# Patient Record
Sex: MALE | Race: WHITE | NOT HISPANIC OR LATINO | Employment: OTHER | URBAN - METROPOLITAN AREA
[De-identification: names, ages, dates, MRNs, and addresses within clinical notes are randomized per-mention and may not be internally consistent; named-entity substitution may affect disease eponyms.]

---

## 2017-12-06 ENCOUNTER — APPOINTMENT (OUTPATIENT)
Dept: GENERAL RADIOLOGY | Facility: HOSPITAL | Age: 62
End: 2017-12-06

## 2017-12-06 ENCOUNTER — HOSPITAL ENCOUNTER (INPATIENT)
Facility: HOSPITAL | Age: 62
LOS: 1 days | Discharge: HOME HEALTH | End: 2017-12-07
Attending: FAMILY MEDICINE | Admitting: FAMILY MEDICINE

## 2017-12-06 ENCOUNTER — APPOINTMENT (OUTPATIENT)
Dept: GENERAL RADIOLOGY | Facility: HOSPITAL | Age: 62
End: 2017-12-06
Attending: INTERNAL MEDICINE

## 2017-12-06 ENCOUNTER — APPOINTMENT (OUTPATIENT)
Dept: ULTRASOUND IMAGING | Facility: HOSPITAL | Age: 62
End: 2017-12-06

## 2017-12-06 DIAGNOSIS — Z74.09 IMPAIRED MOBILITY AND ADLS: Primary | ICD-10-CM

## 2017-12-06 DIAGNOSIS — E87.5 HYPERKALEMIA: ICD-10-CM

## 2017-12-06 DIAGNOSIS — Z74.09 IMPAIRED FUNCTIONAL MOBILITY, BALANCE, GAIT, AND ENDURANCE: ICD-10-CM

## 2017-12-06 DIAGNOSIS — Z78.9 IMPAIRED MOBILITY AND ADLS: Primary | ICD-10-CM

## 2017-12-06 DIAGNOSIS — N17.9 AKI (ACUTE KIDNEY INJURY) (HCC): ICD-10-CM

## 2017-12-06 PROBLEM — R19.7 DIARRHEA: Chronic | Status: ACTIVE | Noted: 2017-12-06

## 2017-12-06 PROBLEM — R53.1 WEAKNESS: Status: ACTIVE | Noted: 2017-12-06

## 2017-12-06 PROBLEM — Z87.01 HISTORY OF PNEUMONIA: Status: ACTIVE | Noted: 2017-12-06

## 2017-12-06 PROBLEM — R06.00 DYSPNEA: Status: ACTIVE | Noted: 2017-12-06

## 2017-12-06 PROBLEM — N18.2 CKD (CHRONIC KIDNEY DISEASE), STAGE II: Chronic | Status: ACTIVE | Noted: 2017-12-06

## 2017-12-06 PROBLEM — J96.01 ACUTE RESPIRATORY FAILURE WITH HYPOXIA (HCC): Status: ACTIVE | Noted: 2017-12-06

## 2017-12-06 PROBLEM — C34.90 LUNG CANCER (HCC): Status: ACTIVE | Noted: 2017-12-06

## 2017-12-06 PROBLEM — C79.31 BRAIN METASTASIS: Status: ACTIVE | Noted: 2017-12-06

## 2017-12-06 PROBLEM — R94.02 ABNORMAL BRAIN SCAN: Chronic | Status: ACTIVE | Noted: 2017-12-06

## 2017-12-06 LAB
ALBUMIN SERPL-MCNC: 4.1 G/DL (ref 3.2–4.8)
ALBUMIN/GLOB SERPL: 1.5 G/DL (ref 1.5–2.5)
ALP SERPL-CCNC: 79 U/L (ref 25–100)
ALT SERPL W P-5'-P-CCNC: 67 U/L (ref 7–40)
ANION GAP SERPL CALCULATED.3IONS-SCNC: 11 MMOL/L (ref 3–11)
APTT PPP: <24 SECONDS (ref 24–31)
AST SERPL-CCNC: 30 U/L (ref 0–33)
BASOPHILS # BLD AUTO: 0.01 10*3/MM3 (ref 0–0.2)
BASOPHILS NFR BLD AUTO: 0.1 % (ref 0–1)
BILIRUB SERPL-MCNC: 0.4 MG/DL (ref 0.3–1.2)
BILIRUB UR QL STRIP: NEGATIVE
BNP SERPL-MCNC: 12 PG/ML (ref 0–100)
BUN BLD-MCNC: 90 MG/DL (ref 9–23)
BUN/CREAT SERPL: 50 (ref 7–25)
CA-I SERPL ISE-MCNC: 1.29 MMOL/L (ref 1.12–1.32)
CALCIUM SPEC-SCNC: 9.6 MG/DL (ref 8.7–10.4)
CHLORIDE SERPL-SCNC: 101 MMOL/L (ref 99–109)
CLARITY UR: CLEAR
CO2 SERPL-SCNC: 23 MMOL/L (ref 20–31)
COLOR UR: YELLOW
CREAT BLD-MCNC: 1.8 MG/DL (ref 0.6–1.3)
CREAT UR-MCNC: 98.6 MG/DL
D-LACTATE SERPL-SCNC: 2.6 MMOL/L (ref 0.5–2)
D-LACTATE SERPL-SCNC: 2.7 MMOL/L (ref 0.5–2)
DEPRECATED RDW RBC AUTO: 59 FL (ref 37–54)
EOSINOPHIL # BLD AUTO: 0.12 10*3/MM3 (ref 0–0.3)
EOSINOPHIL NFR BLD AUTO: 1.1 % (ref 0–3)
EOSINOPHIL SPEC QL MICRO: 0 % EOS/100 CELLS (ref 0–0)
ERYTHROCYTE [DISTWIDTH] IN BLOOD BY AUTOMATED COUNT: 15.9 % (ref 11.3–14.5)
GFR SERPL CREATININE-BSD FRML MDRD: 39 ML/MIN/1.73
GLOBULIN UR ELPH-MCNC: 2.7 GM/DL
GLUCOSE BLD-MCNC: 93 MG/DL (ref 70–100)
GLUCOSE UR STRIP-MCNC: NEGATIVE MG/DL
HCT VFR BLD AUTO: 35.9 % (ref 38.9–50.9)
HGB BLD-MCNC: 11.8 G/DL (ref 13.1–17.5)
HGB UR QL STRIP.AUTO: NEGATIVE
HOLD SPECIMEN: NORMAL
IMM GRANULOCYTES # BLD: 0.2 10*3/MM3 (ref 0–0.03)
IMM GRANULOCYTES NFR BLD: 1.8 % (ref 0–0.6)
INR PPP: 0.96
KETONES UR QL STRIP: NEGATIVE
LEUKOCYTE ESTERASE UR QL STRIP.AUTO: NEGATIVE
LIPASE SERPL-CCNC: 96 U/L (ref 6–51)
LYMPHOCYTES # BLD AUTO: 2.21 10*3/MM3 (ref 0.6–4.8)
LYMPHOCYTES NFR BLD AUTO: 19.7 % (ref 24–44)
MAGNESIUM SERPL-MCNC: 2.7 MG/DL (ref 1.3–2.7)
MCH RBC QN AUTO: 33.3 PG (ref 27–31)
MCHC RBC AUTO-ENTMCNC: 32.9 G/DL (ref 32–36)
MCV RBC AUTO: 101.4 FL (ref 80–99)
MONOCYTES # BLD AUTO: 1.23 10*3/MM3 (ref 0–1)
MONOCYTES NFR BLD AUTO: 11 % (ref 0–12)
NEUTROPHILS # BLD AUTO: 7.46 10*3/MM3 (ref 1.5–8.3)
NEUTROPHILS NFR BLD AUTO: 66.3 % (ref 41–71)
NITRITE UR QL STRIP: NEGATIVE
NRBC BLD MANUAL-RTO: 0 /100 WBC (ref 0–0)
PH UR STRIP.AUTO: 5.5 [PH] (ref 5–8)
PHOSPHATE SERPL-MCNC: 5.2 MG/DL (ref 2.4–5.1)
PLATELET # BLD AUTO: 243 10*3/MM3 (ref 150–450)
PMV BLD AUTO: 9.8 FL (ref 6–12)
POTASSIUM BLD-SCNC: 4.8 MMOL/L (ref 3.5–5.5)
PROT SERPL-MCNC: 6.8 G/DL (ref 5.7–8.2)
PROT UR QL STRIP: NEGATIVE
PROTHROMBIN TIME: 10.4 SECONDS (ref 9.6–11.5)
RBC # BLD AUTO: 3.54 10*6/MM3 (ref 4.2–5.76)
SODIUM BLD-SCNC: 135 MMOL/L (ref 132–146)
SODIUM UR-SCNC: 75 MMOL/L (ref 30–90)
SP GR UR STRIP: 1.02 (ref 1–1.03)
TSH SERPL DL<=0.05 MIU/L-ACNC: 0.32 MIU/ML (ref 0.35–5.35)
UROBILINOGEN UR QL STRIP: NORMAL
WBC NRBC COR # BLD: 11.23 10*3/MM3 (ref 3.5–10.8)

## 2017-12-06 PROCEDURE — 99223 1ST HOSP IP/OBS HIGH 75: CPT | Performed by: INTERNAL MEDICINE

## 2017-12-06 PROCEDURE — 83605 ASSAY OF LACTIC ACID: CPT | Performed by: NURSE PRACTITIONER

## 2017-12-06 PROCEDURE — 63710000001 DEXAMETHASONE PER 0.25 MG: Performed by: NURSE PRACTITIONER

## 2017-12-06 PROCEDURE — 97162 PT EVAL MOD COMPLEX 30 MIN: CPT

## 2017-12-06 PROCEDURE — 84443 ASSAY THYROID STIM HORMONE: CPT | Performed by: NURSE PRACTITIONER

## 2017-12-06 PROCEDURE — 85025 COMPLETE CBC W/AUTO DIFF WBC: CPT | Performed by: NURSE PRACTITIONER

## 2017-12-06 PROCEDURE — 81003 URINALYSIS AUTO W/O SCOPE: CPT | Performed by: NURSE PRACTITIONER

## 2017-12-06 PROCEDURE — 93005 ELECTROCARDIOGRAM TRACING: CPT | Performed by: NURSE PRACTITIONER

## 2017-12-06 PROCEDURE — 82570 ASSAY OF URINE CREATININE: CPT | Performed by: NURSE PRACTITIONER

## 2017-12-06 PROCEDURE — 71010 HC CHEST PA OR AP: CPT

## 2017-12-06 PROCEDURE — 87205 SMEAR GRAM STAIN: CPT | Performed by: INTERNAL MEDICINE

## 2017-12-06 PROCEDURE — 97165 OT EVAL LOW COMPLEX 30 MIN: CPT

## 2017-12-06 PROCEDURE — 83880 ASSAY OF NATRIURETIC PEPTIDE: CPT | Performed by: NURSE PRACTITIONER

## 2017-12-06 PROCEDURE — 80053 COMPREHEN METABOLIC PANEL: CPT | Performed by: NURSE PRACTITIONER

## 2017-12-06 PROCEDURE — 93010 ELECTROCARDIOGRAM REPORT: CPT | Performed by: INTERNAL MEDICINE

## 2017-12-06 PROCEDURE — 76775 US EXAM ABDO BACK WALL LIM: CPT

## 2017-12-06 PROCEDURE — 84100 ASSAY OF PHOSPHORUS: CPT | Performed by: NURSE PRACTITIONER

## 2017-12-06 PROCEDURE — 99254 IP/OBS CNSLTJ NEW/EST MOD 60: CPT | Performed by: PHYSICIAN ASSISTANT

## 2017-12-06 PROCEDURE — 85610 PROTHROMBIN TIME: CPT | Performed by: NURSE PRACTITIONER

## 2017-12-06 PROCEDURE — 92611 MOTION FLUOROSCOPY/SWALLOW: CPT

## 2017-12-06 PROCEDURE — 83735 ASSAY OF MAGNESIUM: CPT | Performed by: NURSE PRACTITIONER

## 2017-12-06 PROCEDURE — 74230 X-RAY XM SWLNG FUNCJ C+: CPT

## 2017-12-06 PROCEDURE — 82330 ASSAY OF CALCIUM: CPT | Performed by: NURSE PRACTITIONER

## 2017-12-06 PROCEDURE — 85730 THROMBOPLASTIN TIME PARTIAL: CPT | Performed by: NURSE PRACTITIONER

## 2017-12-06 PROCEDURE — 92610 EVALUATE SWALLOWING FUNCTION: CPT

## 2017-12-06 PROCEDURE — 84300 ASSAY OF URINE SODIUM: CPT | Performed by: NURSE PRACTITIONER

## 2017-12-06 PROCEDURE — 83690 ASSAY OF LIPASE: CPT | Performed by: NURSE PRACTITIONER

## 2017-12-06 RX ORDER — ACETAMINOPHEN 325 MG/1
650 TABLET ORAL EVERY 4 HOURS PRN
Status: DISCONTINUED | OUTPATIENT
Start: 2017-12-06 | End: 2017-12-07 | Stop reason: HOSPADM

## 2017-12-06 RX ORDER — DEXAMETHASONE 4 MG/1
2 TABLET ORAL
Status: DISCONTINUED | OUTPATIENT
Start: 2017-12-06 | End: 2017-12-07 | Stop reason: HOSPADM

## 2017-12-06 RX ORDER — SODIUM CHLORIDE 0.9 % (FLUSH) 0.9 %
1-10 SYRINGE (ML) INJECTION AS NEEDED
Status: DISCONTINUED | OUTPATIENT
Start: 2017-12-06 | End: 2017-12-07 | Stop reason: HOSPADM

## 2017-12-06 RX ORDER — LISINOPRIL AND HYDROCHLOROTHIAZIDE 12.5; 1 MG/1; MG/1
1 TABLET ORAL DAILY
Status: ON HOLD | COMMUNITY
End: 2017-12-07

## 2017-12-06 RX ORDER — CITALOPRAM 20 MG/1
20 TABLET ORAL EVERY MORNING
Status: DISCONTINUED | OUTPATIENT
Start: 2017-12-06 | End: 2017-12-07 | Stop reason: HOSPADM

## 2017-12-06 RX ORDER — PANTOPRAZOLE SODIUM 40 MG/1
40 TABLET, DELAYED RELEASE ORAL
Status: DISCONTINUED | OUTPATIENT
Start: 2017-12-06 | End: 2017-12-07 | Stop reason: HOSPADM

## 2017-12-06 RX ORDER — METHYLPHENIDATE HYDROCHLORIDE 5 MG/1
5 TABLET ORAL 3 TIMES DAILY
Status: DISCONTINUED | OUTPATIENT
Start: 2017-12-06 | End: 2017-12-07 | Stop reason: HOSPADM

## 2017-12-06 RX ORDER — FAMOTIDINE 20 MG/1
20 TABLET, FILM COATED ORAL 2 TIMES DAILY PRN
Status: DISCONTINUED | OUTPATIENT
Start: 2017-12-06 | End: 2017-12-07 | Stop reason: HOSPADM

## 2017-12-06 RX ORDER — OMEPRAZOLE 20 MG/1
20 CAPSULE, DELAYED RELEASE ORAL DAILY
COMMUNITY

## 2017-12-06 RX ORDER — LISINOPRIL 10 MG/1
10 TABLET ORAL
Status: DISCONTINUED | OUTPATIENT
Start: 2017-12-06 | End: 2017-12-06

## 2017-12-06 RX ORDER — SODIUM CHLORIDE 9 MG/ML
100 INJECTION, SOLUTION INTRAVENOUS CONTINUOUS
Status: ACTIVE | OUTPATIENT
Start: 2017-12-06 | End: 2017-12-06

## 2017-12-06 RX ORDER — DEXAMETHASONE 4 MG/1
2 TABLET ORAL
COMMUNITY
End: 2018-01-29 | Stop reason: HOSPADM

## 2017-12-06 RX ORDER — PENTOXIFYLLINE 400 MG/1
400 TABLET, EXTENDED RELEASE ORAL 2 TIMES DAILY
COMMUNITY
End: 2018-01-29 | Stop reason: HOSPADM

## 2017-12-06 RX ORDER — METHYLPHENIDATE HYDROCHLORIDE 5 MG/1
10 TABLET ORAL EVERY MORNING
COMMUNITY

## 2017-12-06 RX ORDER — ONDANSETRON 2 MG/ML
4 INJECTION INTRAMUSCULAR; INTRAVENOUS EVERY 6 HOURS PRN
Status: DISCONTINUED | OUTPATIENT
Start: 2017-12-06 | End: 2017-12-07 | Stop reason: HOSPADM

## 2017-12-06 RX ORDER — VITAMIN E 268 MG
400 CAPSULE ORAL 2 TIMES DAILY
COMMUNITY
End: 2018-01-29 | Stop reason: HOSPADM

## 2017-12-06 RX ORDER — CITALOPRAM 20 MG/1
20 TABLET ORAL EVERY MORNING
COMMUNITY

## 2017-12-06 RX ORDER — SIMETHICONE 80 MG
80 TABLET,CHEWABLE ORAL 4 TIMES DAILY PRN
Status: DISCONTINUED | OUTPATIENT
Start: 2017-12-06 | End: 2017-12-07 | Stop reason: HOSPADM

## 2017-12-06 RX ORDER — L.ACID,PARA/B.BIFIDUM/S.THERM 8B CELL
1 CAPSULE ORAL DAILY
Status: DISCONTINUED | OUTPATIENT
Start: 2017-12-06 | End: 2017-12-07 | Stop reason: HOSPADM

## 2017-12-06 RX ORDER — ONDANSETRON 4 MG/1
4 TABLET, FILM COATED ORAL EVERY 6 HOURS PRN
Status: DISCONTINUED | OUTPATIENT
Start: 2017-12-06 | End: 2017-12-07 | Stop reason: HOSPADM

## 2017-12-06 RX ADMIN — BARIUM SULFATE 20 ML: 400 PASTE ORAL at 14:41

## 2017-12-06 RX ADMIN — PANTOPRAZOLE SODIUM 40 MG: 40 TABLET, DELAYED RELEASE ORAL at 06:52

## 2017-12-06 RX ADMIN — CITALOPRAM HYDROBROMIDE 20 MG: 20 TABLET ORAL at 06:52

## 2017-12-06 RX ADMIN — Medication 1 CAPSULE: at 10:31

## 2017-12-06 RX ADMIN — SODIUM CHLORIDE 100 ML/HR: 9 INJECTION, SOLUTION INTRAVENOUS at 06:52

## 2017-12-06 RX ADMIN — SODIUM CHLORIDE 500 ML: 9 INJECTION, SOLUTION INTRAVENOUS at 15:46

## 2017-12-06 RX ADMIN — DEXAMETHASONE 2 MG: 4 TABLET ORAL at 10:32

## 2017-12-06 RX ADMIN — METHYLPHENIDATE HYDROCHLORIDE 5 MG: 5 TABLET ORAL at 10:31

## 2017-12-06 RX ADMIN — METHYLPHENIDATE HYDROCHLORIDE 5 MG: 5 TABLET ORAL at 20:36

## 2017-12-06 RX ADMIN — BARIUM SULFATE 55 ML: 0.81 POWDER, FOR SUSPENSION ORAL at 14:41

## 2017-12-06 RX ADMIN — METHYLPHENIDATE HYDROCHLORIDE 5 MG: 5 TABLET ORAL at 15:47

## 2017-12-06 NOTE — NURSING NOTE
ACC REVIEW REPORT: [facility]        PATIENT NAME: Prakash Lawrence    PATIENT ID: [id number]    BED: S452    BED TYPE: TELEMETRY    BED GIVEN TO: ODILON QUARLES RN    TIME BED GIVEN: 216    YOB: 1955    AGE: 62    GENDER: MALE    PREVIOUS ADMIT TO [facility]: NO    PREVIOUS ADMISSION DATE: N/A    PATIENT CLASS: INPATIENT    TODAY'S DATE: 12/6/2017    TRANSFER DATE: 12/6/17    ETA: 0330    TRANSFERRING FACILITY: [facility]    TRANSFERRING FACILITY PHONE # : [phone]    TRANSFERRING MD: SILVIO    DATE/TIME REQUEST RECEIVED: 12/06/2017 130    [facility] RN: KULDIP CASTANEDA RN    REPORT FROM: ODILON QUARLES RN    TIME REPORT TAKEN: 0200    DIAGNOSIS: ACUTE RENAL FAILURE    REASON FOR TRANSFER TO [facility]: HIGHER LEVEL OF CARE    TRANSPORTATION: AMBULANCE    CLINICAL REASON FOR TRANSFER TO [facility]: PATIENT CAME TO ER WITH C/O SOA AND CHEST TIGHTNESS.  SATS AT HOME REPORTED IN LOW 89'S.  ON ARRIVAL HE WAS 98% ON ROOM AIR.  HE WAS FOUND TO BE IN ACUTE RENAL FAILURE ALTHOUGH NURSE COULDN'T TELL ME WHAT HIS CREATININE WAS DUE TO COMPUTERS BEING DOWN BUT SHE DID REMEMBER HIS BUN .      CLINICAL INFORMATION    HEIGHT: 68 INCHES     WEIGHT: 165 LBS    ALLERGIES: NKDA    LUNA: NO    INFECTIOUS DISEASE: NO    ISOLATION: NO    LAST VITAL SIGNS:  TIME:0200  TEMP: AFEB  PULSE: 84  B/P: 119/70  RESP: 18    LAB INFORMATION: K 5.6,  THOSE WERE THE ONLY ONES SHE COULD REMEMBER     MEDS/IV FLUIDS: #18 LAC 60 ML KAYEXALATE, 1L NS, 25 ML D50, 5 UNITS HUMALOG SUB Q      CARDIAC SYSTEM:    CHEST PAIN: NO    RATE: 0    SCALE: 1/10    RHYTHM: NSR    Is patient taking or has patient been given any drugs that could increase bleeding?NO  (Plavix, Brilinta, Effient, Eliquis, Xarelto, Warfarin, Integrilin, Angiomax)    RESPIRATORY SYSTEM:    LUNG SOUNDS:    CLEAR: Y    OXYGEN: NO     O2 SAT: 98    IMAGING FINDINGS: NURSE DIDN'T KNOW WOULD SEND.    CNS/MUSCULOSKELETAL    ALERT AND ORIENTED:    PERSON:  Y  PLACE: Y  TIME: Y    INJURY:  WHERE: NO    KARLA COMA SCALE:    E: 4  M: 6  V: 5    STROKE SCALE: 0    SIZE OF HEMORRHAGE: SMALL SUBARACHNOID THEY HAVE SPOKE WITH DR. PIERCE HERE AND HE THINKS IT IS TUMOR RELATED AND CAN BE FOLLOWED UP OUTPATIENT BUT IS WILLING TO BE CONSULTED.     GI//GY      ABDOMINAL PAIN: N    VOMITING: N    DIARRHEA: N    NAUSEA: N    BOWEL SOUNDS: ACTIVE    OCCULT STOOL: UNKNOWN    VAGINAL BLEEDING: NO    TESTICULAR PAIN: N/A    HEMATURIA: NO    PAST MEDICAL HISTORY: LUNG CA, BRAIN METS, BRAIN SURGERY, HTN, APPENDECTOMY, VASECTOMY      Aylin Cowart RN  12/6/2017  2:18 AM

## 2017-12-06 NOTE — H&P
"    [facility] Medicine Services  HISTORY AND PHYSICAL    Patient Name: Prakash Lawrence  : 1955  MRN: [id number]  Primary Care Physician: Ghanshyam Shafer MD   Oncology: Dr. Pop Miranda ()  Neurosurgery: Dr. Abarca ()  Home health/physical therapy: [facility]    Subjective   Subjective     Chief Complaint:  Sent to ED for SOA/hypoxia    HPI:  Prakash Lawrence is a 62 y.o. male who presented to his local ED at [facility] per direction of his oncology appointment he was at just prior for hypoxia and shortness of breath. Onset . Mild-moderate. Constant. Already resolved since at ER - not requiring any O2, denies SOA.    Mr. Lawrence was found to have EVANGELINA and hyperkalemia requiring admission. They did not have any beds at [place], or at  where their other care is established. He has been transferred to [facility]. Aggravating meds include steroid, lasix, HCTZ, trental, potassium replacement, dehydration and diarrhea, and falls. Wife happens to have a copy of last week labs in her purse with Cr/BUN 1..    Recent healthcare events and pertinent medical history include: lung cancer 2014 s/p BRAD and partial LLL lobectomy and chemo, brain mets s/p craniotomy and radiation, \"multiple places of necrosis\" on brain imaging per spouse, hospitalization 11/15/17-17 at [facility] for PNA (also tx for hypokalemia and replaced), and changes in chronic decadron r/t activity change and thought of brain edema (on chronic decadron 2 mg since spring, increased to 4 mg BID  july, decreased to 2 mg BID  july for BLE edema and also started on short course of lasix, decreased to 2 mg daily on  called in my phone by PCP r/t edema). He has chronic diarrhea since the spring, possibly worse and all liquid since home from hospitalization and very raw/tender eulogio area per wife - unclear if blood in liquid BMs, had spot of bright red blood on underwear " yesterday unclear if rectal or from raw skin. At baseline, Mr. Lawrence is oriented to place and some events and has notable short term memory loss; wife states he also has impaired communication and thought processing, and flat non-complaining or interactive affect. She is his durable POA and manages all his care.    Review of Systems   Unable to perform ROS: Other (Pt has chronic confusion. Wife answer what she observes.)   Constitutional: Positive for activity change and fatigue. Negative for chills, diaphoresis and fever.        Up with walker and w/c, only able to take 3 steps last with PT, 3 falls on Sun/Mon/Tues. Chronic poor appetite and diarrhea follows any PO. Chronic wt gain 55 lbs since spring.   HENT:        Spouse has been observing him coughing and almost choking with PO recently.   Respiratory: Positive for cough and shortness of breath. Negative for apnea and wheezing.         Cough with PO. SOA resolved since at ED.   Cardiovascular: Positive for leg swelling. Negative for chest pain.        Improved with reduced steroid and finished lasix.   Gastrointestinal: Positive for diarrhea. Negative for abdominal pain, blood in stool and vomiting.        No observed blood in stool red or black. Pure brown liquid.   Endocrine:        No know DM, IGT, or steroid induced hyperglycemia. Does not have to check at home. None known high or low.   Genitourinary: Negative for hematuria.        Dark urine, very little UOP last days per wife (states she would notice he would be 12+ hours without having used the toilet, take him and little out)   Musculoskeletal: Negative for joint swelling.   Skin: Negative for rash.        Reports skin erosion and raw eulogio, possibly drained some blood and opened.   Allergic/Immunologic:        States no recent immunosuppression.   Neurological: Positive for weakness. Negative for seizures and syncope.        Chronic dizziness.   Psychiatric/Behavioral: Positive for confusion. The  patient is not nervous/anxious.         Chronic confusion at baseline. Chronic very flat affect and lack of emotion.        Otherwise 10-system ROS reviewed and is negative except as mentioned in the HPI.    Personal History     Past Medical History:   Diagnosis Date   • Abnormal brain scan 12/6/2017   • Acute respiratory failure with hypoxia 12/6/2017   • EVANGELINA (acute kidney injury) 12/6/2017   • Brain metastasis 12/6/2017   • Cancer     lung cancer BRAD 10/2014, brain mets   • Chronic kidney disease     Stage II baseline   • CKD (chronic kidney disease), stage II 12/6/2017   • Diarrhea 12/6/2017   • Dyspnea 12/6/2017   • History of pneumonia 12/6/2017   • Hyperkalemia 12/6/2017   • Lung cancer 12/6/2017       Past Surgical History:   Procedure Laterality Date   • APPENDECTOMY     • CRANIOTOMY      brain mets   • LUNG REMOVAL, PARTIAL      BRAD removed and part of LLL; lung cancer (primary)   • VASECTOMY         Family History: family history includes Cancer in his mother; Diabetes in his father, paternal aunt, and paternal uncle; Heart failure in his father; Kidney disease in his father, paternal aunt, and paternal uncle; No Known Problems in his daughter.     Social History:  reports that he has quit smoking. His smoking use included Cigarettes. He smoked 1.00 pack per day. He has never used smokeless tobacco. He reports that he does not drink alcohol or use illicit drugs.    Medications:  Prescriptions Prior to Admission   Medication Sig Dispense Refill Last Dose   • citalopram (CeleXA) 20 MG tablet Take 20 mg by mouth Every Morning.      • dexamethasone (DECADRON) 4 MG tablet Take 2 mg by mouth Daily With Breakfast.      • doxylamine (UNISOM) 25 MG tablet Take 25 mg by mouth At Night As Needed for Sleep.      • lisinopril-hydrochlorothiazide (PRINZIDE,ZESTORETIC) 10-12.5 MG per tablet Take 1 tablet by mouth Daily.      • methylphenidate (RITALIN) 5 MG tablet Take 5 mg by mouth 3 (Three) Times a Day.      •  omeprazole (priLOSEC) 20 MG capsule Take 20 mg by mouth Daily.      • pentoxifylline (TRENtal) 400 MG CR tablet Take 400 mg by mouth 2 (Two) Times a Day.      • vitamin E 400 UNIT capsule Take 400 Units by mouth 2 (Two) Times a Day.          No Known Allergies    Objective   Objective     Vital Signs:   Temp:  [98.5 °F (36.9 °C)] 98.5 °F (36.9 °C)  Heart Rate:  [79] 79  Resp:  [18] 18  BP: (103-120)/(77-83) 120/77   Total (NIH Stroke Scale): 0    Physical Exam   Constitutional: No distress.   Overweight. Wife is present, involved, and attentive to pt and visit.   HENT:   Head: Normocephalic and atraumatic.   Mouth/Throat: No oropharyngeal exudate.   Eyes: Conjunctivae and EOM are normal. Pupils are equal, round, and reactive to light. Right eye exhibits no discharge. Left eye exhibits no discharge. No scleral icterus.   Neck: No JVD present. No tracheal deviation present.   Large neck girth.   Cardiovascular: Normal rate, regular rhythm, normal heart sounds and intact distal pulses.    Pulmonary/Chest: Effort normal and breath sounds normal. No respiratory distress. He has no wheezes. He has no rales.   Diminished all lobes but clear. Loud snoring lying on back.   Abdominal: Soft. Bowel sounds are normal. There is no tenderness. There is no rigidity, no guarding, no CVA tenderness and negative Belle's sign.   Genitourinary:   Genitourinary Comments: Bladder non-distended, non-tender.   Musculoskeletal: He exhibits no edema or deformity.   Neurological:   Arouses, stares at me but does not speak to me. Oriented to person.   Skin: Skin is warm and dry. He is not diaphoretic.   Did not assess bottom at this time r/t pt resting comfortably.   Psychiatric:   Flat, quiet. Passively cooperative.        Results Reviewed:  I have personally reviewed current lab, radiology, and data and agree.    OSH records notable for:  - WBC 13.6, RBC 3.97, hemoglobin 13.1, hematocrit 39.8, .3, MCH 33.0, MCHC 32.9, RDW S D50 9.0,  RDW CV 16.0, platelet 331, slight increased absolute neutrophils onto percentage.  - D-dimer 1.1 (normal range 0.17-4.4).  - Sodium 131, potassium 5.6 and to see normal range 3.5-5.1), chloride 103, CO2 23, anion gap 12, BU and 115, creatinine 2.9, estimated GFR 24, glucose 113, ionized calcium 4.8.  - CT brain without contrast: Sequela from a bifrontal craniotomy is noted.  There is a focal hyperdensity likely reflecting small amount of subarachnoid hemorrhage within the left frontal subcortical parenchyma adjacent to the anterior falx.  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  The surrounding white matter is hypodense, most likely reflecting gliosis or encephalomalacia, or possible leukoencephalopathy secondary to radiation her prior surgery.  There is no subdural or epidural hematoma formation.  The ventricular system and perimesencephalic cisterns remain patent.  There is no midline shift.  Patchy areas of low attenuation are seen in the subcortical and deep periventricular white matter suggesting a combination of microvascular ischemia and leukoencephalopathy.  There are no acute skull fractures.  The visualized paranasal sinuses are unremarkable.  The visualized orbits, globes, and mastoid air cells are unremarkable.  Impression: Focal hyperdensity suggesting a small amount of subarachnoid hemorrhage noted within the left frontal subcortical parenchyma, as described.    Outpatient labs from 11/27/17 that wife has with her:  -WBC 13.2, RBC 3.74, hemoglobin 12.3, hematocrit 36.2, MCV 97, MCH 32.9, MCHC 34.0, RDW 16.0, platelet 30/6/30, elevated neutrophils.  -Glucose 131, BUN 23, creatinine 1.09, estimated GFR 73, sodium 137, potassium 4.2, chloride 91, carbon dioxide total 23, calcium 9.3, total protein 6.9, albumin 4.2, globulin total 2.7, total bilirubin 0.4, alkaline phosphatase 96, AST42, ALT89.  -Aldolase 9.9.  -CK total 28.  -CK MB  "1.5.    No results found for: BNP  No results found for: PHART  Imaging Results (last 24 hours)     ** No results found for the last 24 hours. **          Assessment/Plan   Assessment / Plan     Hospital Problem List     * (Principal)EVANGELINA (acute kidney injury)    CKD (chronic kidney disease), stage II (Chronic)    Abnormal brain scan (Chronic)    Diarrhea (Chronic)    History of pneumonia    Lung cancer    Brain metastasis    Hyperkalemia    Acute respiratory failure with hypoxia    Dyspnea            Assessment & Plan:    1. Acute kidney injury on chronic kidney disease III / hyperkalemia:  - Recheck labs stat.  - s/p \"60 kayexalate, 1L NS, 25 ml D50, 5 units humalog SQ\"; will ask for MAR to clarify all meds he received and ED note as not included in records.  - Suspect recent aggravating changes to include: Chronic anorexia and diarrhea that may be worse, steroids, hydrochlorothiazide, Trental, unknown antibiotics for pneumonia at outside hospital, potassium supplementation recently, recently lasix, 3 falls this week but denies syncope or known hypotension.  - Hold ACE inhibitor, Trental, and hydrochlorothiazide until renal function improves.    2. Lung cancer with brain metastasis:  - Dx in 2014. BRAD primary. S/p BRAD and partial LLL lobectomy, chemo. Brain metastasis s/p craniotomy and radiation.  - Oncology and neurosurgery at .  - Chronic Decadron - will continue latest reduced dose of 2 mg daily.  Expect chronic leukocytosis with chronic steroid use.  - Continue ritalin.  Unable to have Kaspar uploaded overnight, please have added to chart as able.    3. Abnormal brain scan:  - Possible subarachnoid hemorrhage.  ED provider at OSH spoke with neurosurgery on call Dr. Savage who felt this could be followed up outpatient, but would be happy to consult on patient as needed.  - Wife reports no recurrence of brain metastases, but multiple areas of necrosis had been watching.    4. Acute respiratory failure with " hypoxia / dyspnea / history of pneumonia:  - Observed signs of dysphagia recently, coughing with all by mouth.  Suspect chronic leukocytosis with steroid, does have increased neutrophils.  - Unclear etiology and O2 saturation in the 80s at outpatient oncology, we do not have vitals from outside hospital.  He currently denies any dyspnea, and is not hypoxic or requiring O2 supplementation.   - CXR, diagnostics (include BNP, consider ECHO), speech eval.  Defer any antibiotics until CXR back. Albuterol PRN.    5. Diarrhea:  - C diff PCR - sounds like increased pure liquid and numerous, recent abx for PNA.  - Check occults - elevated .  H/H stable.  On chronic PPI.    DVT prophylaxis: Teds/scuds.  Defer pharmacological until further rule out any GI bleeding, elevated BUN.    CODE STATUS:  Full Code Full code / full support. Has an advanced directive, not available at this moment. Wife as durable POA. Clarified with her code status is full, level of support is full. No known limitations to interventions, blood products, ventilator, artificial nutrition, abx, IV fluids, dialysis.    Admission Status:  I believe this patient meets INPATIENT status due to the need for care which can only be reasonably provided in an hospital setting such as aggressive/expedited ancillary services and/or consultation services, the necessity for IV medications, close physician monitoring and/or the possible need for procedures.  In such, I feel patient’s risk for adverse outcomes and need for care warrant INPATIENT evaluation and predict the patient’s care encounter to likely last beyond 2 midnights.    Kerri Ignacio, APRN   12/06/17   6:40 AM      Brief Attending Admission Attestation     I have seen and examined the patient, performing an independent face-to-face diagnostic evaluation with plan of care reviewed and developed with the advanced practice clinician (APC).      Brief Summary Statement/HPI:   Prakash Lawrence is a  62 y.o. male sent from [facility] where he presented with hypoxia and generalized weakness. Patient was treated for CAP at [place] about 2 weeks ago and since that time has not been doing well. He has had decreased oral intake and increased swelling since that time. He was started on oral lasix but was still noted to have decreased urine output.     Attending Physical Exam:  Constitutional: No acute distress, lethargic, wakes up but quickly falls asleep  Eyes: PERRLA, sclerae anicteric, no conjunctival injection  HENT: NCAT, mucous membranes moist  Neck: Supple, no thyromegaly, no lymphadenopathy, trachea midline  Respiratory: Clear to auscultation bilaterally, nonlabored respirations   Cardiovascular: RRR, no murmurs, rubs, or gallops, palpable pedal pulses bilaterally  Gastrointestinal: Positive bowel sounds, soft, nontender, nondistended  Musculoskeletal: No bilateral ankle edema, no clubbing or cyanosis to extremities  Psychiatric: Appropriate affect, cooperative  Neurologic: Awakens but quickly falls asleep, minimally interactive, not reliably following commands.  Skin: No rashes    CXR personally reviewed with no acute chest disease. Agree with interpretation.    Brief Assessment/Plan :    62 year old male with hx of lung cancer with mets to brain presenting from [facility] with concern of small intracranial hemorrhage and EVANGELINA.  --As to the patient's area of hemorrhage, he has been seen by Dr. Savage who feels the patient may indeed have a very small area of hemorrhage in the area of his prior malignancy however it is felt to be clinically insignificant. Recommends following up at .  --His EVANGELINA/CKD is likely due to volume depletion in setting of poor PO intake and lasix use. He still seems clinically dry and still has elevated lactate. Will continue to hold nephrotoxins and hydrate with IVF. Other potential etiologies include AIN from recent antibiotic use. If renal function doesn't  improve as expected over next day or 2 could consider renal eval.  --PT/OT eval when able.    See above for further detailed assessment and plan developed with APC which I have reviewed and/or edited.    Adrianna Agudelo II, DO  12/06/17  1:02 PM

## 2017-12-06 NOTE — PLAN OF CARE
Problem: Patient Care Overview (Adult)  Goal: Plan of Care Review  Outcome: Ongoing (interventions implemented as appropriate)    12/06/17 1509   Coping/Psychosocial Response Interventions   Plan Of Care Reviewed With patient   Patient Care Overview   Progress (eval)   Outcome Evaluation   Outcome Summary/Follow up Plan Clinical dysphagia evaluation complete. Pt alert and cooperative. Admit w/ acute kidney injury. Recent pneumonia (~2 weeks ago per chart and per pt) and reports of pt coughing w/ PO. Trialed thin via spoon/cup/straw, puree, and solid. Oral structures and functions seemingly WFL. Initially, no overt clinical s/s aspiration w/ any consistencies. However, delayed throat clearing w/ 3 oz water test. Given recent pneumonia and concern for aspiration, pt would benefit from MBS to r/o pharyngeal dysphagia.   REC: NPO. Meds whole w/ applesauce. SLP will f/u w/ MBS.      MBS complete. Pt alert and cooperative. Admit w/ acute kidney injury. PMH of pneumonia (~2 weeks ago), lung cancer, and brain cancer. Trialed thin via spoon/cup/straw, puree, and solid. Prespill of thin to valleculae, which clears w/ completion of the swallow. No penetration/aspiration noted w/ any consistencies trialed. Mild valleculae residue noted w/ thin, which pt clears w/ subsequent swallow.   REC: Regular diet, thin liquids. Meds whole w/ thin or puree, as tolerated. No further SLP services warranted at this time.

## 2017-12-06 NOTE — PLAN OF CARE
Problem: Patient Care Overview (Adult)  Goal: Plan of Care Review  Outcome: Ongoing (interventions implemented as appropriate)    12/06/17 1402   Coping/Psychosocial Response Interventions   Plan Of Care Reviewed With patient   Outcome Evaluation   Outcome Summary/Follow up Plan Pt demonstrates decreased tolerence to activity with BP changes, decreased strength and independenc with mobility. PT to continue during stay.         Problem: Inpatient Physical Therapy  Goal: Bed Mobility Goal LTG- PT  Outcome: Ongoing (interventions implemented as appropriate)    12/06/17 1402   Bed Mobility PT LTG   Bed Mobility PT LTG, Date Established 12/06/17   Bed Mobility PT LTG, Time to Achieve 2 wks   Bed Mobility PT LTG, Activity Type all bed mobility   Bed Mobility PT LTG, [place] Level independent       Goal: Transfer Training Goal 1 LTG- PT  Outcome: Ongoing (interventions implemented as appropriate)    12/06/17 1402   Transfer Training PT LTG   Transfer Training PT LTG, Date Established 12/06/17   Transfer Training PT LTG, Time to Achieve 2 wks   Transfer Training PT LTG, Activity Type sit to stand/stand to sit   Transfer Training PT LTG, [place] Level conditional independence   Transfer Training PT LTG, Assist Device walker, rolling       Goal: Gait Training Goal LTG- PT  Outcome: Ongoing (interventions implemented as appropriate)    12/06/17 1402   Gait Training PT LTG   Gait Training Goal PT LTG, Date Established 12/06/17   Gait Training Goal PT LTG, Time to Achieve 2 wks   Gait Training Goal PT LTG, [place] Level conditional independence   Gait Training Goal PT LTG, Assist Device walker, rolling   Gait Training Goal PT LTG, Distance to Achieve 350

## 2017-12-06 NOTE — PLAN OF CARE
Problem: Patient Care Overview (Adult)  Goal: Plan of Care Review  Outcome: Ongoing (interventions implemented as appropriate)    12/06/17 1341   Coping/Psychosocial Response Interventions   Plan Of Care Reviewed With patient   Outcome Evaluation   Outcome Summary/Follow up Plan Pt. currently exhibits decreased functional strength, endurance and balance, resulting in decreased ADL and txfr I. Recommned SNF at discharge for continued therapy to increase strength and I at PLOF.         Problem: Inpatient Occupational Therapy  Goal: Transfer Training Goal 1 LTG- OT  Outcome: Ongoing (interventions implemented as appropriate)    12/06/17 1341   Transfer Training OT LTG   Transfer Training OT LTG, Date Established 12/06/17   Transfer Training OT LTG, Time to Achieve 1 wk   Transfer Training OT LTG, Activity Type toilet   Transfer Training OT LTG, [place] Level minimum assist (75% patient effort)   Transfer Training OT LTG, Assist Device walker, rolling       Goal: Strength Goal LTG- OT  Outcome: Ongoing (interventions implemented as appropriate)    12/06/17 1341   Strength OT LTG   Strength Goal OT LTG, Date Established 12/06/17   Strength Goal OT LTG, Measure to Achieve Pt will complete UE therapeutic ex/activity each date to increase functional strength.       Goal: Activity Tolerance Goal LTG- OT  Outcome: Ongoing (interventions implemented as appropriate)    12/06/17 1341   Activity Tolerance OT LTG   Activity Tolerance Goal OT LTG, Date Established 12/06/17   Activity Tolerance Goal OT LTG, Time to Achieve 1 wk   Activity Tolerance Goal OT LTG, Activity Level 5 min activity   Activity Tolerance Goal OT LTG, Additional Goal with one rest break

## 2017-12-06 NOTE — PLAN OF CARE
Problem: Patient Care Overview (Adult)  Goal: Plan of Care Review  Outcome: Ongoing (interventions implemented as appropriate)    12/06/17 1189   Coping/Psychosocial Response Interventions   Plan Of Care Reviewed With patient;spouse   Patient Care Overview   Progress no change   Outcome Evaluation   Outcome Summary/Follow up Plan [facility] nurse consulted for perirectal skin breakdown. Assessment performed. Mild excoriation noted to rectal area. Barrier cream applied. See skin care orders for care needs. Will sign off. Thanks

## 2017-12-06 NOTE — PLAN OF CARE
Problem: Patient Care Overview (Adult)  Goal: Plan of Care Review  Outcome: Ongoing (interventions implemented as appropriate)    12/06/17 0703   Coping/Psychosocial Response Interventions   Plan Of Care Reviewed With patient   Patient Care Overview   Progress no change   Outcome Evaluation   Outcome Summary/Follow up Plan pt sleeping in between care, but arouses to voice. VSS.         Problem: Fall Risk (Adult)  Goal: Identify Related Risk Factors and Signs and Symptoms  Outcome: Ongoing (interventions implemented as appropriate)  Goal: Absence of Falls  Outcome: Ongoing (interventions implemented as appropriate)    Problem: Renal Failure/Kidney Injury, Acute (Adult)  Goal: Signs and Symptoms of Listed Potential Problems Will be Absent or Manageable (Renal Failure/Kidney Injury, Acute)  Outcome: Ongoing (interventions implemented as appropriate)

## 2017-12-06 NOTE — MBS/VFSS/FEES
Acute Care - Speech Language Pathology   Swallow Initial Evaluation [facility]   Clinical Swallow Evaluation   & Modified Barium Swallow Study (MBS)      Patient Name: Prakash Lawrence  : 1955  MRN: [id number]  Today's Date: 2017  Onset of Illness/Injury or Date of Surgery Date: 17            Admit Date: 2017    Visit Dx:     ICD-10-CM ICD-9-CM   1. Impaired mobility and ADLs Z74.09 799.89   2. Impaired functional mobility, balance, gait, and endurance Z74.09 V49.89     Patient Active Problem List   Diagnosis   • EVANGELINA (acute kidney injury)   • CKD (chronic kidney disease), stage II   • Abnormal brain scan   • Diarrhea   • History of pneumonia   • Lung cancer   • Brain metastasis   • Hyperkalemia   • Acute respiratory failure with hypoxia   • Dyspnea   • Weakness     Past Medical History:   Diagnosis Date   • Abnormal brain scan 2017   • Acute respiratory failure with hypoxia 2017   • EVANGELINA (acute kidney injury) 2017   • Brain metastasis 2017   • Cancer     lung cancer BRAD 10/2014, brain mets   • Chronic kidney disease     Stage II baseline   • CKD (chronic kidney disease), stage II 2017   • Diarrhea 2017   • Dyspnea 2017   • History of pneumonia 2017   • Hyperkalemia 2017   • Lung cancer 2017     Past Surgical History:   Procedure Laterality Date   • APPENDECTOMY     • CRANIOTOMY      brain mets   • LUNG REMOVAL, PARTIAL      BRAD removed and part of LLL; lung cancer (primary)   • VASECTOMY            SWALLOW EVALUATION (last 72 hours)      Swallow Evaluation       17 1000                Rehab Evaluation    Document Type (P)  evaluation  -AS        Subjective Information (P)  no complaints;agree to therapy  -AS        General Information    Patient Profile Review (P)  yes  -AS        Onset of Illness/Injury (P)  17  -AS        Subjective Patient Observations (P)  Pt alert and cooperative. Family @ bedside.   -AS        Pertinent  History Of Current Problem (P)  Admit w/ EVANGELINA. PMH: PNA (2 weeks ago), lung cancer (lobectomy/chmo), brain cancer (craniotomy/radiation)  -AS        Current Diet Limitations (P)  NPO  -AS        Precautions/Limitations, Vision (P)  WFL;other (see comments)   for purposes of eval   -AS        Precautions/Limitations, Hearing (P)  WFL;other (see comments)   for purposes of eval   -AS        Prior Level of Function- Communication (P)  functional in all spheres  -AS        Prior Level of Function- Swallowing (P)  no diet consistency restrictions  -AS        Plans/Goals Discussed With (P)  patient and family;agreed upon  -AS        Barriers to Rehab (P)  none identified  -AS        Clinical Impression    Patient's Goals For Discharge (P)  return to PO diet  -AS        Family Goals For Discharge (P)  family did not state  -AS        SLP Swallowing Diagnosis (P)  other (see comments)   functional pharyngeal swallow   -AS        Criteria for Skilled Therapeutic Interventions Met (P)  no problems identified which require skilled intervention  -AS        SLP Diet Recommendation (P)  regular textures;thin liquids  -AS        SLP Rec. for Method of Medication Administration (P)  meds whole with thin liquid;meds whole in pudding/applesauce   as tolerated   -AS        Pain Assessment    Pain Assessment (P)  No/denies pain  -AS        Cognitive Assessment/Intervention    Current Cognitive/Communication Assessment (P)  functional  -AS        Orientation Status (P)  oriented to;person;place;situation  -AS        Follows Commands/Answers Questions (P)  100% of the time;able to follow single-step instructions  -AS        Oral Motor Structure and Function    Oral Motor Anatomy and Physiology (P)  patient demonstrates anatomy and physiology that is WNL  -AS        Dentition Assessment (P)  present and adequate  -AS        Secretion Management (P)  WNL/WFL  -AS        Mucosal Quality (P)  dry  -AS        Volitional Swallow (P)  no  difficulties initiating volitional swallow  -AS        Volitional Cough (P)  no difficulties initiating volitional cough  -AS        Oral Musculature General Assessment (P)  WFL (within functional limits)  -AS        General Feeding/Swallowing Observations    Current Feeding Method (P)  NPO  -AS        Observations of Self Feeding Skills (P)  appropriate self feeding skills observed  -AS        Observations of Posture During Feeding (P)  upright in bed  -AS        Clinical Swallow Exam    Mode of Presentation (P)  fed by clinician;self fed;cup;spoon;straw  -AS        Oral Phase Results (P)  intact oral phase without signs of dysfunction  -AS        Pharyngeal Phase Results (P)  sign/symptoms of pharyngeal impairment;throat clear;susupected impaired pharyngeal phase of swallowing  -AS        Summary of Clinical Exam (P)  Clinical dysphagia evaluation complete. Pt alert and cooperative. Admit w/ acute kidney injury. Recent pneumonia (~2 weeks ago per chart and per pt) and reports of pt coughing w/ PO. Trialed thin via spoon/cup/straw, puree, and solid. Oral structures and functions seemingly WFL. Initially, no overt clinical s/s aspiration w/ any consistencies. However, delayed throat clearing w/ 3 oz water test. Given recent pneumonia and concern for aspiration, pt would benefit from MBS to r/o pharyngeal dysphagia. REC: NPO. Meds whole w/ applesauce. SLP will f/u w/ MBS.   -AS        Videofluoroscopic Swallowing Exam    Risks/Benefits Reviewed (P)  risks/benefits explained;patient;family;agreed to eval  -AS        Positioning Needs for Swallow Exam (P)  upright in chair   -AS        Radiologic Views Used for Examination (P)  left lateral view  -AS        Motor Function During Phonation/Speech    Observation Anatomic Considerations (P)  no anatomic structural deviation via videofluroscopy  -AS        VFSS    Mode of Presentation (P)  thin:;pudding:;cohesive solid:;fed by clinician;self fed;cup;spoon;straw  -AS         Oral Transit Impairment (P)  thin:;premature spillage into pharynx due to reduced back of tongue control  -AS        Post Swallow Residue (P)  thin:;residue present in valleculae  -AS        Rosenbek's PenAsp Scale (P)  thin:;pudding:;cohesive solid:;1-->Level 1  -AS        Pharyngeal Phase Results (P)  functional swallow  -AS        Summary of VFSS (P)  MBS complete. Pt alert and cooperative. Admit w/ acute kidney injury. PMH of pneumonia (~2 weeks ago), lung cancer, and brain cancer. Trialed thin via spoon/cup/straw, puree, and solid. Prespill of thin to valleculae, which clears w/ completion of the swallow. No penetration/aspiration noted w/ any consistencies trialed. Mild valleculae residue noted w/ thin, which pt clears w/ subsequent swallow. REC: Regular diet, thin liquids. Meds whole w/ thin or puree, as tolerated. No further SLP services warranted at this time.   -AS        VFSS Swallow Recommendations    Oral Care (P)  oral care with toothbrush and dentifrice BID and PRN  -AS        Recommended Diet (P)  regular textures;thin liquids  -AS          User Key  (r) = Recorded By, (t) = Taken By, (c) = Cosigned By    Initials Name Effective Dates    AS Aylin Shah Speech Therapy Student 08/24/17 -         EDUCATION  The patient has been educated in the following areas:   Dysphagia (Swallowing Impairment) Oral Care/Hydration.    SLP Recommendation and Plan  SLP Swallowing Diagnosis: (P) other (see comments) (functional pharyngeal swallow )  SLP Diet Recommendation: (P) regular textures, thin liquids     SLP Rec. for Method of Medication Administration: (P) meds whole with thin liquid, meds whole in pudding/applesauce (as tolerated )        Criteria for Skilled Therapeutic Interventions Met: (P) no problems identified which require skilled intervention  Anticipated Discharge Disposition: (P) other (see comments) (unknown @ this time )       Plan of Care Review  Plan Of Care Reviewed With: (P) patient  Progress:  (P)  (eval)  Outcome Summary/Follow up Plan: (P) Clinical dysphagia evaluation complete. Pt alert and cooperative. Admit w/ acute kidney injury. Recent pneumonia (~2 weeks ago per chart and per pt) and reports of pt coughing w/ PO. Trialed thin via spoon/cup/straw, puree, and solid. Oral structures and functions seemingly WFL. Initially, no overt clinical s/s aspiration w/ any consistencies. However, delayed throat clearing w/ 3 oz water test. Given recent pneumonia and concern for aspiration, pt would benefit from MBS to r/o pharyngeal dysphagia. REC: NPO. Meds whole w/ applesauce. SLP will f/u w/ MBS. MBS complete. Pt alert and cooperative. Admit w/ acute kidney injury. PMH of pneumonia (~2 weeks ago), lung cancer, and brain cancer. Trialed thin via spoon/cup/straw, puree, and solid. Prespill of thin to valleculae, which clears w/ completion of the swallow. No penetration/aspiration noted w/ any consistencies trialed. Mild valleculae residue noted w/ thin, which pt clears w/ subsequent swallow. REC: Regular diet, thin liquids. Meds whole w/ thin or puree, as tolerated. No further SLP services warranted at this time.              Time Calculation:         Time Calculation- SLP       12/06/17 1512          Time Calculation- SLP    SLP Start Time (P)  1000  -AS      SLP Received On (P)  12/06/17  -AS        User Key  (r) = Recorded By, (t) = Taken By, (c) = Cosigned By    Initials Name Provider Type    AS Aylin Shah Speech Therapy Student Speech Therapy Student          Therapy Charges for Today     Code Description Service Date Service Provider Modifiers Qty    33487285229 HC ST EVAL ORAL PHARYNG SWALLOW 4 12/6/2017 Aylin Shah Speech Therapy Student GN 1    [id number] HC ST MOTION FLUORO EVAL SWALLOW 4 12/6/2017 Aylin Shah Speech Therapy Student GN 1               Aylin Shah Speech Therapy Student  12/6/2017

## 2017-12-06 NOTE — CONSULTS
[facility]    NEUROSURGERY CONSULTATION    Patient: Prakash Lawrence  : 1955   MRN: [id number]    Referring Provider: Sandra Perez DO  Admitting Provider: Adrianna Agudelo II, DO  Admitting Diagnosis:  ARF (acute renal failure) [N17.9]  Hyponatremia [E87.1]  EVANGELINA (acute kidney injury) [N17.9]  Date of Admission:  2017    Patient Care Team:  Ghanshyam Shafer MD as PCP - General (Family Medicine)      Reason for Consultation: Abnormal CT Scan, evaluate for possible subarachnoid hemorrhage    History of Present Illness:   Mr. Lawrence is a 62 y.o. gentleman with a hx of metastatic lung cancer managed by  who was transferred during the night from [facility] to [facility] with EVANGELINA and hyperkalemia.  Consultation was requested for Neurosurgery for possible subarachnoid hemorrhage.  Wife reports steady cognitive and physical decline over the last several months.  His gait has been declining since .  He has been more drowsy and forgetful.  She reports 3 falls in last week with no significant neurological changes.  She reports his oncologist recently decreased his steroids and added ritalin.  He was recently hospitalized before  with pneumonia.        Review of Systems   Pt has chronic confusion. Wife answer what she observes.)    Constitutional: Positive for activity change and fatigue. Negative for chills, diaphoresis and fever.        Up with walker and w/c, only able to take 3 steps last with PT, 3 falls on Sun/Mon/Tues. Chronic poor appetite and diarrhea follows any PO. Chronic wt gain 55 lbs since spring.   HENT:        Spouse has been observing him coughing and almost choking with PO recently.  No hearing, voice, or vision changes.  Respiratory: Positive for cough and shortness of breath. Negative for apnea and wheezing.         Cough with PO. SOA resolved since at ED.   Cardiovascular: Positive for leg swelling. Negative for chest pain.         Improved with reduced steroid and finished lasix.   Gastrointestinal: Positive for diarrhea. Negative for abdominal pain, blood in stool and vomiting.        No observed blood in stool red or black. Pure brown liquid.   Endocrine:        No know DM, IGT, or steroid induced hyperglycemia. Does not have to check at home. None known high or low.   Genitourinary: Negative for hematuria.        Dark urine, very little UOP last days per wife (states she would notice he would be 12+ hours without having used the toilet, take him and little out)   Musculoskeletal: Negative for joint swelling.   Skin: Negative for rash.        Reports skin erosion and raw eulogio, possibly drained some blood and opened.   Allergic/Immunologic:        States no recent immunosuppression.   Neurological: Positive for weakness. Negative for seizures and syncope.        Chronic dizziness.   Psychiatric/Behavioral: Positive for confusion. The patient is not nervous/anxious.         Chronic confusion at baseline. Chronic very flat affect and lack of emotion.      History:  Past Medical History:   Diagnosis Date   • Abnormal brain scan 12/6/2017   • Acute respiratory failure with hypoxia 12/6/2017   • EVANGELINA (acute kidney injury) 12/6/2017   • Brain metastasis 12/6/2017   • Cancer     lung cancer BRAD 10/2014, brain mets   • Chronic kidney disease     Stage II baseline   • CKD (chronic kidney disease), stage II 12/6/2017   • Diarrhea 12/6/2017   • Dyspnea 12/6/2017   • History of pneumonia 12/6/2017   • Hyperkalemia 12/6/2017   • Lung cancer 12/6/2017     Past Surgical History:   Procedure Laterality Date   • APPENDECTOMY     • CRANIOTOMY      brain mets   • LUNG REMOVAL, PARTIAL      BRAD removed and part of LLL; lung cancer (primary)   • VASECTOMY       Family History   Problem Relation Age of Onset   • Cancer Mother    • Diabetes Father    • Kidney disease Father    • Heart failure Father    • No Known Problems Daughter    • Diabetes Paternal Aunt    •  Kidney disease Paternal Aunt    • Diabetes Paternal Uncle    • Kidney disease Paternal Uncle      Social History     Social History   • Marital status:      Spouse name: N/A   • Number of children: N/A   • Years of education: N/A     Occupational History   • Not on file.     Social History Main Topics   • Smoking status: Former Smoker     Packs/day: 1.00     Types: Cigarettes   • Smokeless tobacco: Never Used      Comment: Onset teeneger. Stopped a month before cancer dx in 2014.   • Alcohol use No      Comment: hx social ETOH only in past   • Drug use: No      Comment: possible recreational when young PRN, nothing known else, no iv hx   • Sexual activity: Defer     Other Topics Concern   • Not on file     Social History Narrative    Mr. Lawrence is a 62 year old white  male (of 17 years). They live in [place]. They have two daughters. He has an advanced directive.       Allergies:  Review of patient's allergies indicates no known allergies.  Prescriptions Prior to Admission   Medication Sig Dispense Refill Last Dose   • citalopram (CeleXA) 20 MG tablet Take 20 mg by mouth Every Morning.      • dexamethasone (DECADRON) 4 MG tablet Take 2 mg by mouth Daily With Breakfast.      • doxylamine (UNISOM) 25 MG tablet Take 25 mg by mouth At Night As Needed for Sleep.      • lisinopril-hydrochlorothiazide (PRINZIDE,ZESTORETIC) 10-12.5 MG per tablet Take 1 tablet by mouth Daily.      • methylphenidate (RITALIN) 5 MG tablet Take 5 mg by mouth 3 (Three) Times a Day.      • omeprazole (priLOSEC) 20 MG capsule Take 20 mg by mouth Daily.      • pentoxifylline (TRENtal) 400 MG CR tablet Take 400 mg by mouth 2 (Two) Times a Day.      • vitamin E 400 UNIT capsule Take 400 Units by mouth 2 (Two) Times a Day.           Objective:  Physical Exam  Vitals: Blood pressure 120/76, pulse 80, temperature 98.5 °F (36.9 °C), temperature source Oral, resp. rate 16, weight 105 kg (231 lb 3.2 oz), SpO2 98 %. There is no height  or weight on file to calculate BMI.   General:   Morbidly obese, pleasant WM in NAD. Cushingoid appearing. Resting comfortably in bed.  Slightly drowsy. Slow to awake.  Cooperative.     HEENT: NCAT   Chest Breathing unlabored.   Motor: Normal muscle strength, bulk and tone in upper and lower extremities.    No fasciculations, rigidity, spasticity, or abnormal movements.    Coordination: Coordination is intact. Finger to nose test shows no dysmetria.     Sensation: Sensation is intact to light touch testing throughout.   Station and Gait: Station and gait are deferred   Special Test: No Terrell's signs, there is no ankle clonus    Mild pronator drift on right   Skin: Warm and dry.  Patient is not diaphoretic.      CN II Visual fields are full to confrontation   CN III IV VI: PERRLAB.  EOMI.    Nystagmus is not present.   CN V:  Normal facial sensation and strength of muscles of mastication.   CN VII: Facial movements are symmetric.   CN VIII: Hearing is intact to finger rub bilaterally.     CN IX & X: Palate elevates symmetrically.   CN XI: Shoulder shrug is intact. No weakness.   CN XII: Tongue is midline.       Review of Imaging (Interpretation of scans not reports):  Pending from Dr. Savage    Laboratory Results:   Reviewed on chart.    Assessment & Plan    Problem List:  Principal Problem:    EVANGELINA (acute kidney injury)  Active Problems:    CKD (chronic kidney disease), stage II    Abnormal brain scan    Diarrhea    History of pneumonia    Lung cancer    Brain metastasis    Hyperkalemia    Acute respiratory failure with hypoxia    Dyspnea      ASSESSMENT  Possible small incidental hemorrhage at former tumor site, clinically not significant    TREATMENT RECOMMENDATIONS  Mr. Lawrence is being seen in consult for possible SAH at the request of Sandra Perez DO.  After examination, the plan of care should include supportive measures and outpatient follow up with oncologist/neurosurgery at .   From a neurosurgical  standpoint, this is a clinically insignificant incidental finding in a former tumor site.       Further recommendations to ensue from Dr. Savage.      Thank you for the opportunity to assist in the care of Mr. Lawrence.     LIN Farley MD  [facility]  12/06/17  9:37 AM    As per Ms. Stephens above.  The patient is a 62-year-old gentleman who is followed at  for metastatic lung cancer to the brain for which he has undergone surgical intervention and subsequent radiation treatment.  He's had increasing difficulties with his gait.  He's harbored some drowsiness and some difficulty with his cognitive function.  Apparently early in the week his Decadron was decreased because the increased dose was not helping.  Ritalin was started.    Examination:  The patient was seen this morning and was noted to be somewhat drowsy.  With stimulation he would awaken and follow simple commands.  He is generally oriented.  His speech is fluent.  No pronator drift.  Facies was significantly cushingoid.    Data review:  CT scan of the brain from yesterday demonstrates panventriculomegaly.  Bifrontal leukoencephalopathy changes are noted.  A small area of increased density is noted paramedian in the left frontal region anterior to the frontal horn of the left lateral ventricle.  This is small.    MRI of the brain dated 11/20/17 demonstrates changes consistent with the CT findings above.  There is an area of increased signal anterior to the frontal horn of the left lateral ventricle.  This could represent radiation change versus some residual/recurrent tumor.  There is no mass effect.    Impression/recommendation:  The small area of increased density noted on his CT scan could represent a very small area of hemorrhage within he's previously treated tumor site.  This is not currently clinically significant.  The patient should follow up with his providers at  where he has  been managed closely.    Ran Savage MD

## 2017-12-06 NOTE — THERAPY EVALUATION
Acute Care - Physical Therapy Initial Evaluation  [facility]     Patient Name: Prakash Lawrence  : 1955  MRN: [id number]  Today's Date: 2017   Onset of Illness/Injury or Date of Surgery Date: 17  Date of Referral to PT: 17  Referring Physician: JOSE Ignacio      Admit Date: 2017     Visit Dx:    ICD-10-CM ICD-9-CM   1. Impaired mobility and ADLs Z74.09 799.89   2. Impaired functional mobility, balance, gait, and endurance Z74.09 V49.89     Patient Active Problem List   Diagnosis   • EVANGELINA (acute kidney injury)   • CKD (chronic kidney disease), stage II   • Abnormal brain scan   • Diarrhea   • History of pneumonia   • Lung cancer   • Brain metastasis   • Hyperkalemia   • Acute respiratory failure with hypoxia   • Dyspnea   • Weakness     Past Medical History:   Diagnosis Date   • Abnormal brain scan 2017   • Acute respiratory failure with hypoxia 2017   • EVANGELINA (acute kidney injury) 2017   • Brain metastasis 2017   • Cancer     lung cancer BRAD 10/2014, brain mets   • Chronic kidney disease     Stage II baseline   • CKD (chronic kidney disease), stage II 2017   • Diarrhea 2017   • Dyspnea 2017   • History of pneumonia 2017   • Hyperkalemia 2017   • Lung cancer 2017     Past Surgical History:   Procedure Laterality Date   • APPENDECTOMY     • CRANIOTOMY      brain mets   • LUNG REMOVAL, PARTIAL      BRAD removed and part of LLL; lung cancer (primary)   • VASECTOMY            PT ASSESSMENT (last 72 hours)      PT Evaluation       17 1123 17 1120    Rehab Evaluation    Document Type evaluation  -EH evaluation  -AN    Subjective Information no complaints;agree to therapy  -EH no complaints;agree to therapy  -AN    Patient Effort, Rehab Treatment adequate  -EH adequate  -AN    Symptoms Noted During/After Treatment fatigue;significant change in vital signs  -EH fatigue;significant change in vital signs  -AN    Symptoms Noted Comment  see vitals section  - see vitals section  -AN    General Information    Patient Profile Review yes  -EH yes  -AN    Onset of Illness/Injury or Date of Surgery Date 12/06/17  -EH 12/06/17  -AN    Referring Physician JOSE Ignacio  - JOSE Ignacio  -AN    General Observations Pt supine in bed with IV intact. no family present  - pt supine in bed, IV intact, no family present  -AN    Pertinent History Of Current Problem Pt admited with OSH with SOA, chest tightness, decreased SPO2. Pt found to have EVANGELINA with hx of CDK, lung and brain cancer.  - Pt admitted from OSH with SOA, chest tightness, decreased SATS. Pt found to have EVANGELINA. Hx of CKD, lung and brain cancer  -AN    Precautions/Limitations fall precautions  - fall precautions  -AN    Prior Level of Function min assist:;all household mobility;transfer;independent:;feeding;mod assist:;ADL's;max assist:;home management  - min assist:;all household mobility;transfer;independent:;feeding;mod assist:;dressing;bathing;max assist:;home management  -AN    Equipment Currently Used at Home grab bar;raised toilet;walker, rolling;shower chair  - raised toilet;grab bar;rollator;shower chair  -AN    Plans/Goals Discussed With patient;agreed upon  - patient;agreed upon  -AN    Risks Reviewed patient:;LOB;dizziness;increased discomfort;change in vital signs  - patient:;LOB;dizziness;increased discomfort;change in vital signs  -AN    Benefits Reviewed patient:;improve function;increase independence  - patient:;improve function;increase independence;increase strength;increase balance  -AN    Barriers to Rehab medically complex;previous functional deficit  - medically complex;previous functional deficit  -AN    Living Environment    Lives With spouse  - spouse  -AN    Living Arrangements house  - house  -AN    Home Accessibility no concerns  - no concerns  -AN    Clinical Impression    Date of Referral to PT 12/05/17  -     PT Diagnosis decreased functional  mobility, decreased independence.  -     Criteria for Skilled Therapeutic Interventions Met yes;treatment indicated  -EH     Vital Signs    Pre Systolic BP Rehab 114  -  -AN    Pre Treatment Diastolic BP 72  -EH 72  -AN    Intra Systolic BP Rehab 130  -  -AN    Intra Treatment Diastolic   -  -AN    Post Systolic BP Rehab 98  -EH 98  -AN    Post Treatment Diastolic BP 64  -EH 64  -AN    Pretreatment Heart Rate (beats/min) 81  -EH 81  -AN    Intratreatment Heart Rate (beats/min) 100  -  -AN    Posttreatment Heart Rate (beats/min) 84  -EH 84  -AN    Pre SpO2 (%) 96  -EH 96  -AN    O2 Delivery Pre Treatment room air  -EH room air  -AN    Intra SpO2 (%) 93  -EH 93  -AN    O2 Delivery Intra Treatment room air  -EH room air  -AN    Post SpO2 (%) 96  -EH 96  -AN    O2 Delivery Post Treatment room air  -EH room air  -AN    Pain Assessment    Pain Assessment No/denies pain  - No/denies pain  -AN    Vision Assessment/Intervention    Visual Impairment limited persistence with tracking  - limited persistence with tracking  -AN    Cognitive Assessment/Intervention    Current Cognitive/Communication Assessment impaired  - impaired   flat affect, no one to confirm information  -AN    Orientation Status oriented to;person;place  - oriented to;person;place   knew [facility]  -    Follows Commands/Answers Questions 100% of the time;able to follow single-step instructions;needs cueing;needs repetition  - 100% of the time;needs cueing;needs repetition  -AN    Personal Safety moderate impairment;decreased awareness, need for assist  - moderate impairment;decreased awareness, need for assist  -AN    Personal Safety Interventions fall prevention program maintained;gait belt;nonskid shoes/slippers when out of bed  - fall prevention program maintained;gait belt;nonskid shoes/slippers when out of bed  -AN    ROM (Range of Motion)    General ROM no range of motion deficits identified  - no range  of motion deficits identified  -AN    MMT (Manual Muscle Testing)    General MMT Assessment lower extremity strength deficits identified  -EH upper extremity strength deficits identified  -AN    General MMT Assessment Detail 4+/5  -EH grossly 4/5  -AN    Bed Mobility, Assessment/Treatment    Bed Mobility, Assistive Device bed rails;head of bed elevated  -EH bed rails;head of bed elevated  -AN    Bed Mob, Supine to Sit, Gaffney moderate assist (50% patient effort);2 person assist required  -EH moderate assist (50% patient effort);2 person assist required;verbal cues required  -AN    Bed Mob, Sit to Supine, Gaffney minimum assist (75% patient effort);2 person assist required  -EH minimum assist (75% patient effort);2 person assist required;verbal cues required  -AN    Transfer Assessment/Treatment    Transfers, Sit-Stand [place]ney moderate assist (50% patient effort);2 person assist required;verbal cues required  -EH moderate assist (50% patient effort);2 person assist required;verbal cues required  -AN    Transfers, Stand-Sit Gaffney moderate assist (50% patient effort);2 person assist required;verbal cues required  -EH moderate assist (50% patient effort);2 person assist required;verbal cues required  -AN    Transfers, Sit-Stand-Sit, Assist Device rolling walker  -EH rolling walker  -AN    Transfer, Impairments strength decreased;impaired balance  -EH strength decreased;impaired balance  -AN    Gait Assessment/Treatment    Gait, Gaffney Level not tested;other (see comments)  -     Gait, Comment Pt with increased BP; RN request pt return to bed.  -EH     Motor Skills/Interventions    Additional Documentation Balance Skills Training (Group)  -EH Balance Skills Training (Group);Fine Motor Coordination Training (Group);Gross Motor Coordination Training (Group)  -AN    Balance Skills Training    Sitting-Level of Assistance Contact guard   progressed to SBA  -EH Contact guard   CGA to SBA  -AN     Sitting-Balance Support Feet supported  -EH Feet supported  -AN    Sitting-Balance Activities Lateral lean  -EH Lateral lean  -AN    Sitting # of Minutes 6  -EH 6  -AN    Standing-Level of Assistance Minimum assistance;x2  -EH Minimum assistance;x2  -AN    Static Standing Balance Support assistive device  -EH assistive device  -AN    Fine Motor Coordination Training    Detail (Fine Motor Coordination Training)  impaired  -AN    Gross Motor Coordination Training    Gross Motor Skill, Impairments Detail  impaired  -AN    Positioning and Restraints    Pre-Treatment Position in bed  - in bed  -AN    Post Treatment Position bed  - bed  -AN    In Bed notified nsg;supine;call light within reach;encouraged to call for assist;exit alarm on  - supine;call light within reach;encouraged to call for assist;exit alarm on;notified nsg  -AN      12/06/17 1000 12/06/17 0800    Rehab Evaluation    Document Type (P)  evaluation  -AS     Subjective Information (P)  no complaints;agree to therapy  -AS     Pain Assessment    Pain Assessment (P)  No/denies pain  -AS     Cognitive Assessment/Intervention    Current Cognitive/Communication Assessment (P)  functional  -AS     Orientation Status (P)  oriented to;person;place;situation  -AS     Follows Commands/Answers Questions (P)  100% of the time;able to follow single-step instructions  -AS     Sensory Assessment/Intervention    Light Touch --  -SD LUE;RUE;LLE;RLE  -SD    LUE Light Touch --  -SD WNL  -SD    RUE Light Touch --  -SD WNL  -SD    LLE Light Touch --  -SD WNL  -SD    RLE Light Touch --  -SD WNL  -SD      12/06/17 0424       General Information    Equipment Currently Used at Home commode;oxygen;shower chair;wheelchair;walker, rolling  -SC     Living Environment    Lives With spouse  -SC     Living Arrangements house  -SC     Home Accessibility no concerns  -SC     Stair Railings at Home none  -SC     Type of Financial/Environmental Concern none  -SC     Transportation  Available car;family or friend will provide  -SC     Living Environment Comment n/a  -SC     Sensory Assessment/Intervention    Light Touch LUE;RUE;LLE;RLE  -SC     LUE Light Touch WNL  -SC     RUE Light Touch WNL  -SC     LLE Light Touch WNL  -SC     RLE Light Touch WNL  -SC       User Key  (r) = Recorded By, (t) = Taken By, (c) = Cosigned By    Initials Name Provider Type     Cassi Mendoza, PT Physical Therapist    JESUS ALBERTO Quijano, OT Occupational Therapist    GRAHAM Cash, RN Registered Nurse    KYREE Mejia, RN Registered Nurse    AS Aylin Shah, Speech Therapy Student Speech Therapy Student          Physical Therapy Education     Title: PT OT SLP Therapies (Active)     Topic: Physical Therapy (Active)     Point: Mobility training (Done)    Learning Progress Summary    Learner Readiness Method Response Comment Documented by Status   Patient Acceptance E VU,NR   12/06/17 1402 Done               Point: Body mechanics (Done)    Learning Progress Summary    Learner Readiness Method Response Comment Documented by Status   Patient Acceptance E ,NR   12/06/17 1402 Done               Point: Precautions (Done)    Learning Progress Summary    Learner Readiness Method Response Comment Documented by Status   Patient Acceptance E VU,NR   12/06/17 1402 Done                      User Key     Initials Effective Dates Name Provider Type [facility] 06/19/15 -  Cassi Mendoza, PT Physical Therapist PT                PT Recommendation and Plan  Anticipated Discharge Disposition: skilled nursing facility (vs home with HHPT/OT)  PT Frequency: daily  Plan of Care Review  Plan Of Care Reviewed With: patient  Outcome Summary/Follow up Plan: Pt demonstrates decreased tolerence to activity with BP changes, decreased strength and independenc with mobility. PT to continue during stay.          IP PT Goals       12/06/17 1402          Bed Mobility PT LTG    Bed Mobility PT LTG, Date Established  12/06/17  -EH      Bed Mobility PT LTG, Time to Achieve 2 wks  -EH      Bed Mobility PT LTG, Activity Type all bed mobility  -EH      Bed Mobility PT LTG, [place] Level independent  -EH      Transfer Training PT LTG    Transfer Training PT LTG, Date Established 12/06/17  -EH      Transfer Training PT LTG, Time to Achieve 2 wks  -EH      Transfer Training PT LTG, Activity Type sit to stand/stand to sit  -EH      Transfer Training PT LTG, [place] Level conditional independence  -EH      Transfer Training PT LTG, Assist Device walker, rolling  -EH      Gait Training PT LTG    Gait Training Goal PT LTG, Date Established 12/06/17  -EH      Gait Training Goal PT LTG, Time to Achieve 2 wks  -EH      Gait Training Goal PT LTG, [place] Level conditional independence  -EH      Gait Training Goal PT LTG, Assist Device walker, rolling  -EH      Gait Training Goal PT LTG, Distance to Achieve 350  -EH        User Key  (r) = Recorded By, (t) = Taken By, (c) = Cosigned By    Initials Name Provider Type     Cassi Mendoza, PT Physical Therapist                Outcome Measures       12/06/17 1123 12/06/17 1120       How much help from another person do you currently need...    Turning from your back to your side while in flat bed without using bedrails? 3  -EH      Moving from lying on back to sitting on the side of a flat bed without bedrails? 3  -EH      Moving to and from a bed to a chair (including a wheelchair)? 3  -EH      Standing up from a chair using your arms (e.g., wheelchair, bedside chair)? 3  -EH      Climbing 3-5 steps with a railing? 2  -EH      To walk in hospital room? 3  -EH      AM-PAC 6 Clicks Score 17  -EH      How much help from another is currently needed...    Putting on and taking off regular lower body clothing?  2  -AN     Bathing (including washing, rinsing, and drying)  2  -AN     Toileting (which includes using toilet bed pan or urinal)  2  -AN     Putting on and taking off regular  upper body clothing  2  -AN     Taking care of personal grooming (such as brushing teeth)  3  -AN     Eating meals  3  -AN     Score  14  -AN     Functional Assessment    Outcome Measure Options AM-PAC 6 Clicks Basic Mobility (PT)  - AM-PAC 6 Clicks Daily Activity (OT)  -AN       User Key  (r) = Recorded By, (t) = Taken By, (c) = Cosigned By    Initials Name Provider Type     Cassi Mendoza, PT Physical Therapist    JESUS ALBERTO Quijano, OT Occupational Therapist           Time Calculation:         PT Charges       12/06/17 1447          Time Calculation    Start Time 1123  -      PT Received On 12/06/17  -      PT Goal Re-Cert Due Date 12/16/17  -      Time Calculation- PT    Total Timed Code Minutes- PT 0 minute(s)  -        User Key  (r) = Recorded By, (t) = Taken By, (c) = Cosigned By    Initials Name Provider Type     Cassi Mendoza, PT Physical Therapist          Therapy Charges for Today     Code Description Service Date Service Provider Modifiers Qty    [id number] HC PT EVAL MOD COMPLEXITY 4 12/6/2017 Cassi Mendoza, PT GP 1          PT G-Codes  Outcome Measure Options: AM-PAC 6 Clicks Basic Mobility (PT)      Cassi Mendoza, PT  12/6/2017

## 2017-12-06 NOTE — THERAPY EVALUATION
Acute Care - Occupational Therapy Initial Evaluation  [facility]     Patient Name: Prakash Lawrence  : 1955  MRN: [id number]  Today's Date: 2017  Onset of Illness/Injury or Date of Surgery Date: 17  Date of Referral to OT: 17  Referring Physician: JOSE Ignacio    Admit Date: 2017       ICD-10-CM ICD-9-CM   1. Impaired mobility and ADLs Z74.09 799.89     Patient Active Problem List   Diagnosis   • EVANGELINA (acute kidney injury)   • CKD (chronic kidney disease), stage II   • Abnormal brain scan   • Diarrhea   • History of pneumonia   • Lung cancer   • Brain metastasis   • Hyperkalemia   • Acute respiratory failure with hypoxia   • Dyspnea   • Weakness     Past Medical History:   Diagnosis Date   • Abnormal brain scan 2017   • Acute respiratory failure with hypoxia 2017   • EVANGELINA (acute kidney injury) 2017   • Brain metastasis 2017   • Cancer     lung cancer BRAD 10/2014, brain mets   • Chronic kidney disease     Stage II baseline   • CKD (chronic kidney disease), stage II 2017   • Diarrhea 2017   • Dyspnea 2017   • History of pneumonia 2017   • Hyperkalemia 2017   • Lung cancer 2017     Past Surgical History:   Procedure Laterality Date   • APPENDECTOMY     • CRANIOTOMY      brain mets   • LUNG REMOVAL, PARTIAL      BRAD removed and part of LLL; lung cancer (primary)   • VASECTOMY            OT ASSESSMENT FLOWSHEET (last 72 hours)      OT Evaluation       17 1120 17 1000 17 0800 17 0424       Rehab Evaluation    Document Type evaluation  -AN (P)  evaluation  -AS       Subjective Information no complaints;agree to therapy  -AN (P)  no complaints;agree to therapy  -AS       Patient Effort, Rehab Treatment adequate  -AN        Symptoms Noted During/After Treatment fatigue;significant change in vital signs  -AN        Symptoms Noted Comment see vitals section  -AN        General Information    Patient Profile Review yes   -AN        Onset of Illness/Injury or Date of Surgery Date 12/06/17  -AN        Referring Physician JOSE Ignacio  -AN        General Observations pt supine in bed, IV intact, no family present  -AN        Pertinent History Of Current Problem Pt admitted from OSH with SOA, chest tightness, decreased SATS. Pt found to have EVANGELINA. Hx of CKD, lung and brain cancer  -AN        Precautions/Limitations fall precautions  -AN        Prior Level of Function min assist:;all household mobility;transfer;independent:;feeding;mod assist:;dressing;bathing;max assist:;home management  -AN        Equipment Currently Used at Home raised toilet;grab bar;rollator;shower chair  -AN   commode;oxygen;shower chair;wheelchair;walker, rolling  -SC     Plans/Goals Discussed With patient;agreed upon  -AN        Risks Reviewed patient:;LOB;dizziness;increased discomfort;change in vital signs  -AN        Benefits Reviewed patient:;improve function;increase independence;increase strength;increase balance  -AN        Barriers to Rehab medically complex;previous functional deficit  -AN        Living Environment    Lives With spouse  -AN   spouse  -SC     Living Arrangements house  -AN   house  -SC     Home Accessibility no concerns  -AN   no concerns  -SC     Stair Railings at Home    none  -SC     Type of Financial/Environmental Concern    none  -SC     Transportation Available    car;family or friend will provide  -SC     Living Environment Comment    n/a  -SC     Clinical Impression    Date of Referral to OT 12/06/17  -AN        OT Diagnosis Decreased ADL I  -AN        Impairments Found (describe specific impairments) muscle performance;gait, locomotion, and balance;aerobic capacity/endurance  -AN        Patient/Family Goals Statement Agreeable to OT  -AN        Criteria for Skilled Therapeutic Interventions Met yes;treatment indicated  -AN        Rehab Potential good, to achieve stated therapy goals  -AN        Therapy Frequency daily  -AN         Anticipated Equipment Needs At Discharge --   TBD  -AN        Anticipated Discharge Disposition skilled nursing facility  -AN        Functional Level Prior    Ambulation    2-->assistive person  -SC     Transferring    2-->assistive person  -SC     Toileting    2-->assistive person  -SC     Bathing    2-->assistive person  -SC     Dressing    2-->assistive person  -SC     Eating    0-->independent  -SC     Communication    0-->understands/communicates without difficulty  -SC     Swallowing    0-->swallows foods/liquids without difficulty  -SC     Prior Functional Level Comment    n/a  -SC     Vital Signs    Pre Systolic BP Rehab 114  -AN        Pre Treatment Diastolic BP 72  -AN        Intra Systolic BP Rehab 130  -AN        Intra Treatment Diastolic   -AN        Post Systolic BP Rehab 98  -AN        Post Treatment Diastolic BP 64  -AN        Pretreatment Heart Rate (beats/min) 81  -AN        Intratreatment Heart Rate (beats/min) 100  -AN        Posttreatment Heart Rate (beats/min) 84  -AN        Pre SpO2 (%) 96  -AN        O2 Delivery Pre Treatment room air  -AN        Intra SpO2 (%) 93  -AN        O2 Delivery Intra Treatment room air  -AN        Post SpO2 (%) 96  -AN        O2 Delivery Post Treatment room air  -AN        Pain Assessment    Pain Assessment No/denies pain  -AN (P)  No/denies pain  -AS       Vision Assessment/Intervention    Visual Impairment limited persistence with tracking  -AN        Cognitive Assessment/Intervention    Current Cognitive/Communication Assessment impaired   flat affect, no one to confirm information  -AN (P)  functional  -AS       Orientation Status oriented to;person;place   knew hospital  -AN (P)  oriented to;person;place;situation  -AS       Follows Commands/Answers Questions 100% of the time;needs cueing;needs repetition  -AN (P)  100% of the time;able to follow single-step instructions  -AS       Personal Safety moderate impairment;decreased awareness, need for assist   -AN        Personal Safety Interventions fall prevention program maintained;gait belt;nonskid shoes/slippers when out of bed  -AN        ROM (Range of Motion)    General ROM no range of motion deficits identified  -AN        MMT (Manual Muscle Testing)    General MMT Assessment upper extremity strength deficits identified  -AN        General MMT Assessment Detail grossly 4/5  -AN        Bed Mobility, Assessment/Treatment    Bed Mobility, Assistive Device bed rails;head of bed elevated  -AN        Bed Mob, Supine to Sit, Mobile moderate assist (50% patient effort);2 person assist required;verbal cues required  -AN        Bed Mob, Sit to Supine, Mobile minimum assist (75% patient effort);2 person assist required;verbal cues required  -AN        Transfer Assessment/Treatment    Transfers, Sit-Stand Mobile moderate assist (50% patient effort);2 person assist required;verbal cues required  -AN        Transfers, Stand-Sit Mobile moderate assist (50% patient effort);2 person assist required;verbal cues required  -AN        Transfers, Sit-Stand-Sit, Assist Device rolling walker  -AN        Transfer, Impairments strength decreased;impaired balance  -AN        Functional Mobility    Functional Mobility- Ind. Level other (see comments);not tested   BP changes  -AN        Lower Body Bathing Assessment/Training    LB Bathing Assess/Train, Comment simulated mod  -AN        Upper Body Dressing Assessment/Training    UB Dressing Assess/Train, Clothing Type doffing:;donning:;hospital gown  -AN        UB Dressing Assess/Train, Position sitting  -AN        UB Dressing Assess/Train, Mobile moderate assist (50% patient effort)  -AN        Lower Body Dressing Assessment/Training    LB Dressing Assess/Train, Comment simulated max  -AN        Toileting Assessment/Training    Toileting Assess/Train, Assistive Device urinal  -AN        Toileting Assess/Train, Position standing  -AN        Toileting Assess/Train,  Indepen Level moderate assist (50% patient effort)  -AN        Motor Skills/Interventions    Additional Documentation Balance Skills Training (Group);Fine Motor Coordination Training (Group);Gross Motor Coordination Training (Group)  -AN        Balance Skills Training    Sitting-Level of Assistance Contact guard   CGA to SBA  -AN        Sitting-Balance Support Feet supported  -AN        Sitting-Balance Activities Lateral lean  -AN        Sitting # of Minutes 6  -AN        Standing-Level of Assistance Minimum assistance;x2  -AN        Static Standing Balance Support assistive device  -AN        Gross Motor Coordination Training    Gross Motor Skill, Impairments Detail impaired  -AN        Fine Motor Coordination Training    Detail (Fine Motor Coordination Training) impaired  -AN        Sensory Assessment/Intervention    Light Touch  --  -SD LUE;RUE;LLE;RLE  -SD LUE;RUE;LLE;RLE  -SC     LUE Light Touch  --  -SD WNL  -SD WNL  -SC     RUE Light Touch  --  -SD WNL  -SD WNL  -SC     LLE Light Touch  --  -SD WNL  -SD WNL  -SC     RLE Light Touch  --  -SD WNL  -SD WNL  -SC     General Therapy Interventions    Planned Therapy Interventions activity intolerance;ADL retraining;balance training;strengthening;transfer training  -AN        Positioning and Restraints    Pre-Treatment Position in bed  -AN        Post Treatment Position bed  -AN        In Bed supine;call light within reach;encouraged to call for assist;exit alarm on;notified nsg  -AN          User Key  (r) = Recorded By, (t) = Taken By, (c) = Cosigned By    Initials Name Effective Dates    AN Daija Quijano OT 06/22/15 -     GRAHAM Cash RN 06/16/16 -     KYREE Mejia RN 08/08/16 -     AS Aylin Shah, Speech Therapy Student 08/24/17 -            Occupational Therapy Education     Title: PT OT SLP Therapies (Active)     Topic: Occupational Therapy (Active)     Point: ADL training (Active)    Description: Instruct learner(s) on proper safety  adaptation and remediation techniques during self care or transfers.   Instruct in proper use of assistive devices.    Learning Progress Summary    Learner Readiness Method Response Comment Documented by Status   Patient Acceptance E NR Discussed current deficits and need for assist. Discussed POC. AN 12/06/17 1339 Active                      User Key     Initials Effective Dates Name Provider Type Discipline    AN 06/22/15 -  Daija Quijano OT Occupational Therapist OT                  OT Recommendation and Plan  Anticipated Equipment Needs At Discharge:  (TBD)  Anticipated Discharge Disposition: skilled nursing facility  Planned Therapy Interventions: activity intolerance, ADL retraining, balance training, strengthening, transfer training  Therapy Frequency: daily                 Outcome Measures       12/06/17 1120          How much help from another is currently needed...    Putting on and taking off regular lower body clothing? 2  -AN      Bathing (including washing, rinsing, and drying) 2  -AN      Toileting (which includes using toilet bed pan or urinal) 2  -AN      Putting on and taking off regular upper body clothing 2  -AN      Taking care of personal grooming (such as brushing teeth) 3  -AN      Eating meals 3  -AN      Score 14  -AN      Functional Assessment    Outcome Measure Options AM-PAC 6 Clicks Daily Activity (OT)  -AN        User Key  (r) = Recorded By, (t) = Taken By, (c) = Cosigned By    Initials Name Provider Type    AN Daija Quijano OT Occupational Therapist          Time Calculation:   OT Start Time: 1120    Therapy Charges for Today     Code Description Service Date Service Provider Modifiers Qty    [id number]  OT EVAL LOW COMPLEXITY 4 12/6/2017 Daija Quijano OT GO 1               Daija Quijano OT  12/6/2017

## 2017-12-07 VITALS
RESPIRATION RATE: 20 BRPM | BODY MASS INDEX: 31.37 KG/M2 | TEMPERATURE: 97.7 F | DIASTOLIC BLOOD PRESSURE: 88 MMHG | OXYGEN SATURATION: 95 % | HEART RATE: 71 BPM | WEIGHT: 231.6 LBS | SYSTOLIC BLOOD PRESSURE: 139 MMHG | HEIGHT: 72 IN

## 2017-12-07 PROBLEM — R06.00 DYSPNEA: Status: RESOLVED | Noted: 2017-12-06 | Resolved: 2017-12-07

## 2017-12-07 PROBLEM — E87.5 HYPERKALEMIA: Status: RESOLVED | Noted: 2017-12-06 | Resolved: 2017-12-07

## 2017-12-07 PROBLEM — R19.7 DIARRHEA: Chronic | Status: RESOLVED | Noted: 2017-12-06 | Resolved: 2017-12-07

## 2017-12-07 PROBLEM — N17.9 AKI (ACUTE KIDNEY INJURY) (HCC): Status: RESOLVED | Noted: 2017-12-06 | Resolved: 2017-12-07

## 2017-12-07 LAB
ANION GAP SERPL CALCULATED.3IONS-SCNC: 10 MMOL/L (ref 3–11)
BUN BLD-MCNC: 49 MG/DL (ref 9–23)
BUN/CREAT SERPL: 44.5 (ref 7–25)
CALCIUM SPEC-SCNC: 8.8 MG/DL (ref 8.7–10.4)
CHLORIDE SERPL-SCNC: 100 MMOL/L (ref 99–109)
CO2 SERPL-SCNC: 23 MMOL/L (ref 20–31)
CREAT BLD-MCNC: 1.1 MG/DL (ref 0.6–1.3)
GFR SERPL CREATININE-BSD FRML MDRD: 68 ML/MIN/1.73
GLUCOSE BLD-MCNC: 100 MG/DL (ref 70–100)
POTASSIUM BLD-SCNC: 4.2 MMOL/L (ref 3.5–5.5)
SODIUM BLD-SCNC: 133 MMOL/L (ref 132–146)

## 2017-12-07 PROCEDURE — 97110 THERAPEUTIC EXERCISES: CPT

## 2017-12-07 PROCEDURE — 97116 GAIT TRAINING THERAPY: CPT

## 2017-12-07 PROCEDURE — 63710000001 DEXAMETHASONE PER 0.25 MG: Performed by: NURSE PRACTITIONER

## 2017-12-07 PROCEDURE — 99239 HOSP IP/OBS DSCHRG MGMT >30: CPT | Performed by: NURSE PRACTITIONER

## 2017-12-07 PROCEDURE — 80048 BASIC METABOLIC PNL TOTAL CA: CPT | Performed by: INTERNAL MEDICINE

## 2017-12-07 RX ORDER — SODIUM CHLORIDE 9 MG/ML
50 INJECTION, SOLUTION INTRAVENOUS CONTINUOUS
Status: DISCONTINUED | OUTPATIENT
Start: 2017-12-07 | End: 2017-12-07 | Stop reason: HOSPADM

## 2017-12-07 RX ORDER — L.ACID,PARA/B.BIFIDUM/S.THERM 8B CELL
1 CAPSULE ORAL DAILY
Qty: 30 CAPSULE | Refills: 0 | Status: SHIPPED | OUTPATIENT
Start: 2017-12-08

## 2017-12-07 RX ORDER — LISINOPRIL AND HYDROCHLOROTHIAZIDE 12.5; 1 MG/1; MG/1
1 TABLET ORAL DAILY
Start: 2017-12-07 | End: 2018-01-29 | Stop reason: HOSPADM

## 2017-12-07 RX ADMIN — CITALOPRAM HYDROBROMIDE 20 MG: 20 TABLET ORAL at 08:22

## 2017-12-07 RX ADMIN — METHYLPHENIDATE HYDROCHLORIDE 5 MG: 5 TABLET ORAL at 08:22

## 2017-12-07 RX ADMIN — DEXAMETHASONE 2 MG: 4 TABLET ORAL at 08:22

## 2017-12-07 RX ADMIN — PANTOPRAZOLE SODIUM 40 MG: 40 TABLET, DELAYED RELEASE ORAL at 08:22

## 2017-12-07 RX ADMIN — Medication 1 CAPSULE: at 08:22

## 2017-12-07 NOTE — PROGRESS NOTES
[facility] Medicine Services  PROGRESS NOTE    Patient Name: Prakash Lawrence  : 1955  MRN: [id number]    Date of Admission: 2017  Length of Stay: 1  Primary Care Physician: Ghanshyam Shafer MD    Subjective   Subjective     CC:f/u EVANGELINA, hyperkalemia    HPI:  Sitting up in bed, finishing breakfast. Feeling better today. Denies soa, cough, pain, further diarrhea. Worked with PT yesterday and feels very weak.     Review of Systems  Gen- No fevers, chills  CV- No chest pain, palpitations  Resp- No cough, dyspnea  GI- No N/V/D, abd pain    Otherwise ROS is negative except as mentioned in the HPI.    Objective   Objective     Vital Signs:   Temp:  [97.7 °F (36.5 °C)-98.1 °F (36.7 °C)] 97.7 °F (36.5 °C)  Heart Rate:  [71-98] 71  Resp:  [18-20] 20  BP: (108-139)/(72-88) 139/88  Total (NIH Stroke Scale): 0     Physical Exam:  Constitutional: No acute distress, awake, alert  HENT: NCAT, mucous membranes moist  Respiratory: Clear to auscultation bilaterally, respiratory effort normal   Cardiovascular: RRR, no murmurs, rubs, or gallops, palpable pedal pulses bilaterally  Gastrointestinal: Positive bowel sounds, soft, nontender, nondistended  Musculoskeletal: No bilateral ankle edema  Psychiatric: flat affect, cooperative  Neurologic: Oriented x 3, strength symmetric in all extremities- non focal weakness, Cranial Nerves grossly intact to confrontation, speech clear  Skin: No rashes      Results Reviewed:  I have personally reviewed current lab, radiology, and data and agree.      Results from last 7 days  Lab Units 17  0610   WBC 10*3/mm3 11.23*   HEMOGLOBIN g/dL 11.8*   HEMATOCRIT % 35.9*   PLATELETS 10*3/mm3 243   INR  0.96       Results from last 7 days  Lab Units 17  0436 17  0610   SODIUM mmol/L 133 135   POTASSIUM mmol/L 4.2 4.8   CHLORIDE mmol/L 100 101   CO2 mmol/L 23.0 23.0   BUN mg/dL 49* 90*   CREATININE mg/dL 1.10 1.80*   GLUCOSE mg/dL 100 93   CALCIUM mg/dL  8.8 9.6   ALT (SGPT) U/L  --  67*   AST (SGOT) U/L  --  30     BNP   Date Value Ref Range Status   12/06/2017 12.0 0.0 - 100.0 pg/mL Final     No results found for: PHART    Microbiology Results Abnormal     Procedure Component Value - Date/Time    Eosinophil Smear - Urine, Urine, Clean Catch [[id number]]  (Normal) Collected:  12/06/17 0700    Lab Status:  Final result Specimen:  Urine from Urine, Clean Catch Updated:  12/06/17 1701     Eosinophil Smear 0 % EOS/100 Cells     Narrative:       No eosinophil seen          Imaging Results (last 24 hours)     Procedure Component Value Units Date/Time    XR Chest 1 View [[id number]] Collected:  12/06/17 1006     Updated:  12/06/17 1147    Narrative:       EXAMINATION: XR CHEST 1 VW-12/06/2017:      INDICATION: SOA/hypoxia, recent edema, just TX for PNA at OSH recently,  dysphagia, CXR at OSH with part of LLL not included in image and want to  include.      COMPARISON: NONE.     FINDINGS:   1.  Lordotic projection. Minimal atelectasis left base. Mild stranding  in the upper lung zones.  2. Otherwise, the heart size is normal and there is no active edema or  active consolidation.           Impression:       Lordotic image with obstructive lung disease and mild  atelectatic density left base. Global edema or high grade consolidation  is not currently identified. There is no global cardiac edema and no  pleural effusion.     D:  12/06/2017  E:  12/06/2017     This report was finalized on 12/6/2017 11:45 AM by Dr. Jas Brantley MD.       FL Video Swallow With Speech [[id number]] Collected:  12/06/17 1534     Updated:  12/06/17 1650    Narrative:       EXAMINATION: FL VIDEO SWALLOW W SPEECH-     INDICATION: dysphagia; Z74.09-Other reduced mobility; Z74.09-Other  reduced mobility     TECHNIQUE: 48 seconds of fluoroscopic time was used for this exam. 1  associated image was saved. The patient was evaluated in the seated  lateral position while taking a variety of consistencies of  barium by  mouth under the direction of speech pathology.     COMPARISON: NONE     FINDINGS: There was no penetration and no aspiration with any of the  media ingested.          Impression:       Fluoroscopy provided for a modified barium swallow. Please  see speech therapy report for full details and recommendations.         This report was finalized on 12/6/2017 4:48 PM by Dr. Carlos Perez MD.       US Renal Bilateral [[id number]] Updated:  12/06/17 1825             I have reviewed the medications.    Assessment/Plan   Assessment / Plan     Hospital Problem List     * (Principal)EVANGELINA (acute kidney injury)    CKD (chronic kidney disease), stage II (Chronic)    Abnormal brain scan (Chronic)    Diarrhea (Chronic)    Lung cancer    Brain metastasis    Hyperkalemia    Dyspnea    Weakness             Brief Hospital Course to date:  Prakash Lawrence is a 62 y.o. male with h/o metastatic lung ca s/p BRAD/ partial LLL lobectomy 2014 sent from [facility] where he presented with hypoxia and generalized weakness. Patient was treated for CAP at [place] about 2 weeks ago and since that time has not been doing well. Worsening oral intake, weakness and increased swelling since that time. He was started on oral lasix but was still noted to have decreased urine output. He was transferred to [facility] for EVANGELINA/ Hyperkalemia, and poss small ICH      Assessment & Plan:  1. Acute kidney injury on chronic kidney disease III / hyperkalemia:  -improved, s/p IVFs, kayexalate  - PIV lost last night- attempt new one appear baseline cr/BUN- 1.09/ 23, cont gentle IVFs  - encourage oral intake, recheck in am     2. Lung cancer with brain metastasis:  - Dx in 2014. BRAD primary. S/p BRAD and partial LLL lobectomy, chemo. Brain metastasis s/p craniotomy and radiation.  - Oncology and neurosurgery at .  - continue daily decadron     3.  Possible ICH  - Dr. Savage has reviewed CT/ MRI with possible incidental small area of hemorrhage vs  residual/ recurrent tumor.  - Clinically insignificant. F/U with [facility] providers     4. Acute respiratory failure with hypoxia / dyspnea / history of pneumonia:  - resolved, on room air  - slp rec reg diet with thins     5. Diarrhea:  - none in last 24 hours, c diff pending with recent abx    6. Leukocytosis  - chronic steroid therapy, likely reactive    7. Generalized weakness/ falls  -- PT/OT rec SNF/ rehab, consult CM for possible options    DVT Prophylaxis:  mechanical    CODE STATUS: Full Code    Disposition: I expect the patient to be discharged 1-2 days    Renetta Mendoza, APRN  12/07/17  8:56 AM

## 2017-12-07 NOTE — THERAPY TREATMENT NOTE
Acute Care - Physical Therapy Treatment Note  [facility]     Patient Name: Prakash Lawrence  : 1955  MRN: [id number]  Today's Date: 2017  Onset of Illness/Injury or Date of Surgery Date: 17  Date of Referral to PT: 17  Referring Physician: JOSE Ignacio    Admit Date: 2017    Visit Dx:    ICD-10-CM ICD-9-CM   1. Impaired mobility and ADLs Z74.09 799.89   2. Impaired functional mobility, balance, gait, and endurance Z74.09 V49.89     Patient Active Problem List   Diagnosis   • EVANGELINA (acute kidney injury)   • CKD (chronic kidney disease), stage II   • Abnormal brain scan   • Diarrhea   • History of pneumonia   • Lung cancer   • Brain metastasis   • Hyperkalemia   • Acute respiratory failure with hypoxia   • Dyspnea   • Weakness               Adult Rehabilitation Note       17 0850          Rehab Assessment/Intervention    Discipline physical therapy assistant  -UD      Document Type therapy note (daily note)  -UD      Subjective Information agree to therapy;complains of;weakness  -UD      Patient Effort, Rehab Treatment good  -UD      Symptoms Noted During/After Treatment fatigue  -UD      Precautions/Limitations fall precautions  -UD      Recorded by [UD] Thu Obregon PTA      Vital Signs    Pre Systolic BP Rehab --   vitals stable today  -UD      Post Systolic BP Rehab 139  -UD      Post Treatment Diastolic BP 88  -UD      O2 Delivery Pre Treatment room air  -UD      O2 Delivery Post Treatment room air  -UD      Pre Patient Position Supine  -UD      Intra Patient Position Standing  -UD      Post Patient Position Sitting  -UD      Recorded by [UD] Thu Obregon PTA      Pain Assessment    Pain Assessment No/denies pain  -UD      Recorded by [UD] Thu Obregon PTA      Cognitive Assessment/Intervention    Orientation Status oriented to;person  -UD      Follows Commands/Answers Questions 100% of the time  -UD      Personal Safety Interventions fall prevention program maintained   -UD      Recorded by [UD] Thu Obregon PTA      Bed Mobility, Assessment/Treatment    Bed Mob, Supine to Sit, [place] minimum assist (75% patient effort)  -UD      Recorded by [UD] Thu Obregon PTA      Transfer Assessment/Treatment    Transfers, Sit-Stand [place] minimum assist (75% patient effort);2 person assist required  -UD      Transfers, Stand-Sit [place] minimum assist (75% patient effort);2 person assist required  -UD      Transfers, Sit-Stand-Sit, Assist Device rolling walker  -UD      Toilet Transfer, [place] minimum assist (75% patient effort);2 person assist required  -UD      Toilet Transfer, Assistive Device bedside commode with drop arms;rolling walker  -UD      Recorded by [UD] Thu Obregon PTA      Gait Assessment/Treatment    Gait, [place] Level minimum assist (75% patient effort);1 person + 1 person to manage equipment  -UD      Gait, Assistive Device rolling walker  -UD      Gait, Distance (Feet) 120  -UD      Gait, Gait Deviations freda decreased;forward flexed posture  -UD      Gait, Safety Issues step length decreased  -UD      Gait, Impairments strength decreased  -UD      Recorded by [UD] Thu Obregon PTA      Therapy Exercises    Bilateral Lower Extremities AROM:;10 reps;sitting  -UD      Recorded by [UD] Thu Obregon PTA      Positioning and Restraints    Pre-Treatment Position in bed  -UD      Post Treatment Position chair  -UD      In Chair notified nsg;reclined;sitting;call light within reach;exit alarm on;legs elevated  -UD      Recorded by [UD] Thu Obregon PTA        User Key  (r) = Recorded By, (t) = Taken By, (c) = Cosigned By    Initials Name Effective Dates     Thu Obregon PTA 06/22/15 -                 IP PT Goals       12/07/17 1041 12/06/17 1402       Bed Mobility PT LTG    Bed Mobility PT LTG, Date Established  12/06/17  -EH     Bed Mobility PT LTG, Time to Achieve  2 wks  -EH     Bed Mobility PT LTG, Activity Type  all bed mobility  -EH      Bed Mobility PT LTG, [place] Level  independent  -EH     Bed Mobility PT LTG, Outcome goal ongoing  -UD      Transfer Training PT LTG    Transfer Training PT LTG, Date Established  12/06/17  -EH     Transfer Training PT LTG, Time to Achieve  2 wks  -EH     Transfer Training PT LTG, Activity Type  sit to stand/stand to sit  -EH     Transfer Training PT LTG, [place] Level  conditional independence  -EH     Transfer Training PT LTG, Assist Device  walker, rolling  -EH     Transfer Training PT LTG, Outcome goal ongoing  -UD      Gait Training PT LTG    Gait Training Goal PT LTG, Date Established  12/06/17  -EH     Gait Training Goal PT LTG, Time to Achieve  2 wks  -EH     Gait Training Goal PT LTG, [place] Level  conditional independence  -EH     Gait Training Goal PT LTG, Assist Device  walker, rolling  -EH     Gait Training Goal PT LTG, Distance to Achieve  350  -EH     Gait Training Goal PT LTG, Outcome goal ongoing  -UD        User Key  (r) = Recorded By, (t) = Taken By, (c) = Cosigned By    Initials Name Provider Type    FELICIA Obregon, PTA Physical Therapy Assistant     Cassi Mendoza, PT Physical Therapist          Physical Therapy Education     Title: PT OT SLP Therapies (Active)     Topic: Physical Therapy (Done)     Point: Mobility training (Done)    Learning Progress Summary    Learner Readiness Method Response Comment Documented by Status   Patient Acceptance ARLET BATRES,NR   12/07/17 1040 Done    Acceptance E [facility] 12/06/17 1402 Done               Point: Home exercise program (Done)    Learning Progress Summary    Learner Readiness Method Response Comment Documented by Status   Patient Acceptance ARLET BATRES,NR   12/07/17 1040 Done               Point: Body mechanics (Done)    Learning Progress Summary    Learner Readiness Method Response Comment Documented by Status   Patient Acceptance ARLET BATRES,NR   12/07/17 1040 Done    Acceptance E [facility] 12/06/17 1402 Done               Point:  Precautions (Done)    Learning Progress Summary    Learner Readiness Method Response Comment Documented by Status   Patient Acceptance E,D ROYAL,NR   12/07/17 1040 Done    Acceptance E CLAUDE,NR   12/06/17 1402 Done                      User Key     Initials Effective Dates Name Provider Type Discipline     06/22/15 -  Thu Obregon, PTA Physical Therapy Assistant PT     06/19/15 -  Cassi Mendoza, PT Physical Therapist PT                    PT Recommendation and Plan  Anticipated Discharge Disposition: skilled nursing facility (vs home with HHPT/OT)  PT Frequency: daily  Plan of Care Review  Plan Of Care Reviewed With: patient  Progress: improving  Outcome Summary/Follow up Plan: pt needs minim. assist for transfers and gait.ambulat 120 ft today.sat up in chair          Outcome Measures       12/07/17 0850 12/06/17 1123 12/06/17 1120    How much help from another person do you currently need...    Turning from your back to your side while in flat bed without using bedrails? 3  -UD 3  -EH     Moving from lying on back to sitting on the side of a flat bed without bedrails? 3  -UD 3  -EH     Moving to and from a bed to a chair (including a wheelchair)? 3  -UD 3  -EH     Standing up from a chair using your arms (e.g., wheelchair, bedside chair)? 3  -UD 3  -EH     Climbing 3-5 steps with a railing? 2  -UD 2  -EH     To walk in hospital room? 3  -UD 3  -EH     AM-PAC 6 Clicks Score 17  -UD 17  -EH     How much help from another is currently needed...    Putting on and taking off regular lower body clothing?   2  -AN    Bathing (including washing, rinsing, and drying)   2  -AN    Toileting (which includes using toilet bed pan or urinal)   2  -AN    Putting on and taking off regular upper body clothing   2  -AN    Taking care of personal grooming (such as brushing teeth)   3  -AN    Eating meals   3  -AN    Score   14  -AN    Functional Assessment    Outcome Measure Options  AM-PAC 6 Clicks Basic Mobility (PT)  -EH  AM-PAC 6 Clicks Daily Activity (OT)  -AN      User Key  (r) = Recorded By, (t) = Taken By, (c) = Cosigned By    Initials Name Provider Type    FELICIA Obregon PTA Physical Therapy Assistant    IKE Mendoza, PT Physical Therapist    JESUS ALBERTO Quijano, OT Occupational Therapist           Time Calculation:         PT Charges       12/07/17 1043          Time Calculation    PT Received On 12/07/17  -UD      PT Goal Re-Cert Due Date 12/16/17  -      Time Calculation- PT    Total Timed Code Minutes- PT 24 minute(s)  -UD        User Key  (r) = Recorded By, (t) = Taken By, (c) = Cosigned By    Initials Name Provider Type    FELICIA Obregon PTA Physical Therapy Assistant          Therapy Charges for Today     Code Description Service Date Service Provider Modifiers Qty    71677615582 HC PT THER PROC EA 15 MIN 12/7/2017 Thu Obregon, PTA GP 1    16780856836 HC GAIT TRAINING EA 15 MIN 12/7/2017 Thu Obregon, PTA GP 1    77104476575 HC PT THER SUPP EA 15 MIN 12/7/2017 Thu Obregon, RENETTA GP 1          PT G-Codes  Outcome Measure Options: AM-PAC 6 Clicks Basic Mobility (PT)    Thu Obregon PTA  12/7/2017

## 2017-12-07 NOTE — DISCHARGE SUMMARY
[facility] Medicine Services  DISCHARGE SUMMARY    Patient Name: Prakash Lawrence  : 1955  MRN: [id number]    Date of Admission: 2017  Date of Discharge:    Length of Stay: 1  Primary Care Physician: Ghanshyam Shafer MD    Consults     Date and Time Order Name Status Description    2017 0548 Inpatient Consult to Neurosurgery Completed         Hospital Course     Presenting Problem:   ARF (acute renal failure) [N17.9]  Hyponatremia [E87.1]  EVANGELINA (acute kidney injury) [N17.9]    Active Hospital Problems (** Indicates Principal Problem)    Diagnosis Date Noted   • CKD (chronic kidney disease), stage II [N18.2] 2017   • Abnormal brain scan [R94.02] 2017   • Lung cancer [C34.90] 2017   • Brain metastasis [C79.31] 2017   • Weakness [R53.1] 2017      Resolved Hospital Problems    Diagnosis Date Noted Date Resolved   • **EVANGELINA (acute kidney injury) [N17.9] 2017   • Diarrhea [R19.7] 2017   • Hyperkalemia [E87.5] 2017   • Dyspnea [R06.00] 2017          Hospital Course:  Prakash Lawrence is a 62 y.o. male with h/o metastatic lung ca s/p BRAD/ partial LLL lobectomy  sent from [facility] where he presented with hypoxia and generalized weakness. Patient was treated for CAP at [facility] about 2 weeks ago and since that time has not been doing well. Worsening oral intake, weakness and increased swelling since that time. He was started on oral lasix but was still noted to have decreased urine output. He was transferred to [facility] for EVANGELINA/ Hyperkalemia, and poss small ICH.  1. Acute kidney injury on chronic kidney disease III / hyperkalemia:  -improved, s/p IVFs, kayexalate  - PIV lost last night- attempt new one appear baseline cr/BUN- 1., cont gentle IVFs  - encourage oral intake, patient refusing IV and fluids.      2. Lung cancer with brain metastasis:  - Dx in .  BRAD primary. S/p BRAD and partial LLL lobectomy, chemo. Brain metastasis s/p craniotomy and radiation.  - Oncology and neurosurgery at .  - continue daily decadron      3.  Possible ICH  - Dr. Savage has reviewed CT/ MRI with possible incidental small area of hemorrhage vs residual/ recurrent tumor.  - Clinically insignificant. F/U with [facility] providers      4. Acute respiratory failure with hypoxia / dyspnea / history of pneumonia:  - resolved, on room air  - slp rec reg diet with thins      5. Diarrhea:  - none in last 24 hours, c diff pending with recent abx     6. Leukocytosis  - chronic steroid therapy, likely reactive     7. Generalized weakness/ falls  -- PT/OT rec SNF/ rehab, consult CM for possible options  Patient currently refusing rehabilitation services.  Agrees to return home with home health, skilled nursing and wife.  Wife requesting to take patient home today to continue care area have urged patient to continue good oral hydration and follow-up with primary care within the week.  He will need repeat BM P in 1 week.  We will continue to hold lisinopril/HCTZ for dehydration and lowered blood pressure.            Day of Discharge     HPI:   Patient seen earlier today and feeling better. Continues to refuse further IV and fluids.  Have recommended patient stay for additional IV fluids and physical therapy.  Have discussed inpatient rehabilitation due to ongoing and worsening weakness/falls.  Patient adamantly refuses inpatient rehabilitation.  Wife is in treatment to take home with home health skilled nursing and home health physical therapy/occupational therapy as previously prescribed per primary care physician.  Patient's wife states she is off work through the first of the year to help patient at home.    Review of Systems  Gen- No fevers, chills  CV- No chest pain, palpitations  Resp- No cough, dyspnea  GI- No N/V/D, abd pain      Otherwise ROS is negative except as mentioned in the HPI.    Vital  Signs:   Temp:  [97.7 °F (36.5 °C)-98.1 °F (36.7 °C)] 97.7 °F (36.5 °C)  Heart Rate:  [71-98] 71  Resp:  [18-20] 20  BP: (108-139)/(73-88) 139/88     Physical Exam:  Constitutional: No acute distress, awake, alert  HENT: NCAT, mucous membranes moist  Respiratory: Clear to auscultation bilaterally, respiratory effort normal   Cardiovascular: RRR, no murmurs, rubs, or gallops, palpable pedal pulses bilaterally  Gastrointestinal: Positive bowel sounds, soft, nontender, nondistended  Musculoskeletal: No bilateral ankle edema  Psychiatric: flat affect, cooperative  Neurologic: Oriented x 3, strength symmetric in all extremities- non focal weakness, Cranial Nerves grossly intact to confrontation, speech clear  Skin: No rashes       Pertinent  and/or Most Recent Results         Results from last 7 days  Lab Units 12/07/17  0436 12/06/17  0610   WBC 10*3/mm3  --  11.23*   HEMOGLOBIN g/dL  --  11.8*   HEMATOCRIT %  --  35.9*   PLATELETS 10*3/mm3  --  243   SODIUM mmol/L 133 135   POTASSIUM mmol/L 4.2 4.8   CHLORIDE mmol/L 100 101   CO2 mmol/L 23.0 23.0   BUN mg/dL 49* 90*   CREATININE mg/dL 1.10 1.80*   GLUCOSE mg/dL 100 93   CALCIUM mg/dL 8.8 9.6       Results from last 7 days  Lab Units 12/06/17  0610   BILIRUBIN mg/dL 0.4   ALK PHOS U/L 79   ALT (SGPT) U/L 67*   AST (SGOT) U/L 30   PROTIME Seconds 10.4   INR  0.96   APTT seconds <24.0*           Invalid input(s): TG, LDLREALC    Results from last 7 days  Lab Units 12/06/17  0610   TSH mIU/mL 0.321*   BNP pg/mL 12.0     Brief Urine Lab Results  (Last result in the past 365 days)      Color   Clarity   Blood   Leuk Est   Nitrite   Protein   CREAT   Urine HCG        12/06/17 0700             98.6       12/06/17 0700 Yellow Clear Negative Negative Negative Negative               Microbiology Results Abnormal     Procedure Component Value - Date/Time    Eosinophil Smear - Urine, Urine, Clean Catch [[id number]]  (Normal) Collected:  12/06/17 0700    Lab Status:  Final result  Specimen:  Urine from Urine, Clean Catch Updated:  12/06/17 1701     Eosinophil Smear 0 % EOS/100 Cells     Narrative:       No eosinophil seen          Imaging Results (all)     Procedure Component Value Units Date/Time    XR Chest 1 View [[id number]] Collected:  12/06/17 1006     Updated:  12/06/17 1147    Narrative:       EXAMINATION: XR CHEST 1 VW-12/06/2017:      INDICATION: SOA/hypoxia, recent edema, just TX for PNA at OSH recently,  dysphagia, CXR at OSH with part of LLL not included in image and want to  include.      COMPARISON: NONE.     FINDINGS:   1.  Lordotic projection. Minimal atelectasis left base. Mild stranding  in the upper lung zones.  2. Otherwise, the heart size is normal and there is no active edema or  active consolidation.           Impression:       Lordotic image with obstructive lung disease and mild  atelectatic density left base. Global edema or high grade consolidation  is not currently identified. There is no global cardiac edema and no  pleural effusion.     D:  12/06/2017  E:  12/06/2017     This report was finalized on 12/6/2017 11:45 AM by Dr. Jas Brantley MD.       FL Video Swallow With Speech [[id number]] Collected:  12/06/17 1534     Updated:  12/06/17 1650    Narrative:       EXAMINATION: FL VIDEO SWALLOW W SPEECH-     INDICATION: dysphagia; Z74.09-Other reduced mobility; Z74.09-Other  reduced mobility     TECHNIQUE: 48 seconds of fluoroscopic time was used for this exam. 1  associated image was saved. The patient was evaluated in the seated  lateral position while taking a variety of consistencies of barium by  mouth under the direction of speech pathology.     COMPARISON: NONE     FINDINGS: There was no penetration and no aspiration with any of the  media ingested.          Impression:       Fluoroscopy provided for a modified barium swallow. Please  see speech therapy report for full details and recommendations.         This report was finalized on 12/6/2017 4:48 PM by   Carlos Perez MD.       US Renal Bilateral [[id number]] Collected:  12/07/17 0923     Updated:  12/07/17 1035    Narrative:       EXAMINATION: US RENAL BILATERAL-     INDICATION: Acute kidney injury/chronic kidney disease; Z74.09-Other  reduced mobility.     TECHNIQUE: Ultrasound of the kidneys was performed in the longitudinal  and transverse planes.     COMPARISON: None.     FINDINGS: The right kidney measures 14.5 cm in length.  The left kidney  has a maximal sagittal dimension of 12.2 cm. There is no renal mass,  stone or obstruction.       Impression:       Normal ultrasound of the kidneys.      D:  12/07/2017  E:  12/07/2017     This report was finalized on 12/7/2017 10:33 AM by Dr. Carlos Perez MD.                    Discharge Details      Prakash Lawrence   Home Medication Instructions CELSO:[id number]    Printed on:12/07/17 1536   Medication Information                      citalopram (CeleXA) 20 MG tablet  Take 20 mg by mouth Every Morning.             dexamethasone (DECADRON) 4 MG tablet  Take 2 mg by mouth Daily With Breakfast.             doxylamine (UNISOM) 25 MG tablet  Take 25 mg by mouth At Night As Needed for Sleep.             lactobacillus acidophilus (RISAQUAD) capsule capsule  Take 1 capsule by mouth Daily.             lisinopril-hydrochlorothiazide (PRINZIDE,ZESTORETIC) 10-12.5 MG per tablet  Take 1 tablet by mouth Daily. Hold medication until cleared to resume by PCP             methylphenidate (RITALIN) 5 MG tablet  Take 5 mg by mouth 3 (Three) Times a Day.             omeprazole (priLOSEC) 20 MG capsule  Take 20 mg by mouth Daily.             pentoxifylline (TRENtal) 400 MG CR tablet  Take 400 mg by mouth 2 (Two) Times a Day.             vitamin E 400 UNIT capsule  Take 400 Units by mouth 2 (Two) Times a Day.                   Discharge Disposition:  Home or Self Care    Discharge Diet:  Diet Instructions     Advance Diet As Tolerated                     Discharge Activity:  Activity  Instructions     Activity as Tolerated                     Special Instructions:  Continue oral hydration    No future appointments.    Additional Instructions for the Follow-ups that You Need to Schedule     Discharge Follow-up with PCP    As directed    Follow Up Details:  1 week       Discharge Follow-up with Specified Provider: [facility]; 2 Weeks    As directed    To:  [facility]   Follow Up:  2 Weeks       Basic Metabolic Panel    Dec 14, 2017 (Approximate)    Repeat BMP for EVANGELINA/ hyperkalemia check  Results to PCP                 Time Spent on Discharge:  45min    JOSE Moran  12/07/17  3:36 PM

## 2017-12-07 NOTE — PLAN OF CARE
Problem: Patient Care Overview (Adult)  Goal: Plan of Care Review  Outcome: Ongoing (interventions implemented as appropriate)    12/07/17 9842   Coping/Psychosocial Response Interventions   Plan Of Care Reviewed With patient   Patient Care Overview   Progress progress toward functional goals as expected         Problem: Fall Risk (Adult)  Goal: Identify Related Risk Factors and Signs and Symptoms  Outcome: Ongoing (interventions implemented as appropriate)  Goal: Absence of Falls  Outcome: Ongoing (interventions implemented as appropriate)

## 2017-12-07 NOTE — PLAN OF CARE
Problem: Patient Care Overview (Adult)  Goal: Plan of Care Review  Outcome: Ongoing (interventions implemented as appropriate)    12/07/17 1041   Coping/Psychosocial Response Interventions   Plan Of Care Reviewed With patient   Patient Care Overview   Progress improving   Outcome Evaluation   Outcome Summary/Follow up Plan pt needs minim. assist for transfers and gait.ambulat 120 ft today.sat up in chair         Problem: Inpatient Physical Therapy  Goal: Bed Mobility Goal LTG- PT  Outcome: Ongoing (interventions implemented as appropriate)    12/06/17 1402 12/07/17 1041   Bed Mobility PT LTG   Bed Mobility PT LTG, Date Established 12/06/17 --    Bed Mobility PT LTG, Time to Achieve 2 wks --    Bed Mobility PT LTG, Activity Type all bed mobility --    Bed Mobility PT LTG, [place] Level independent --    Bed Mobility PT LTG, Outcome --  goal ongoing       Goal: Transfer Training Goal 1 LTG- PT  Outcome: Ongoing (interventions implemented as appropriate)    12/06/17 1402 12/07/17 1041   Transfer Training PT LTG   Transfer Training PT LTG, Date Established 12/06/17 --    Transfer Training PT LTG, Time to Achieve 2 wks --    Transfer Training PT LTG, Activity Type sit to stand/stand to sit --    Transfer Training PT LTG, [place] Level conditional independence --    Transfer Training PT LTG, Assist Device walker, rolling --    Transfer Training PT LTG, Outcome --  goal ongoing       Goal: Gait Training Goal LTG- PT  Outcome: Ongoing (interventions implemented as appropriate)    12/06/17 1402 12/07/17 1041   Gait Training PT LTG   Gait Training Goal PT LTG, Date Established 12/06/17 --    Gait Training Goal PT LTG, Time to Achieve 2 wks --    Gait Training Goal PT LTG, [place] Level conditional independence --    Gait Training Goal PT LTG, Assist Device walker, rolling --    Gait Training Goal PT LTG, Distance to Achieve 350 --    Gait Training Goal PT LTG, Outcome --  goal ongoing

## 2017-12-07 NOTE — NURSING NOTE
[facility] notified of orders to resume Home Health PT/OT and Skilled nursing and a BMP in one week. Orders faxed to [facility].

## 2017-12-07 NOTE — PLAN OF CARE
Problem: Patient Care Overview (Adult)  Goal: Plan of Care Review  Outcome: Ongoing (interventions implemented as appropriate)  Goal: Adult Individualization and Mutuality  Outcome: Ongoing (interventions implemented as appropriate)  Goal: Discharge Needs Assessment  Outcome: Ongoing (interventions implemented as appropriate)    Problem: Fall Risk (Adult)  Goal: Identify Related Risk Factors and Signs and Symptoms  Outcome: Ongoing (interventions implemented as appropriate)  Goal: Absence of Falls  Outcome: Ongoing (interventions implemented as appropriate)    Problem: Renal Failure/Kidney Injury, Acute (Adult)  Goal: Signs and Symptoms of Listed Potential Problems Will be Absent or Manageable (Renal Failure/Kidney Injury, Acute)  Outcome: Ongoing (interventions implemented as appropriate)

## 2017-12-07 NOTE — PROGRESS NOTES
Discharge Planning Assessment  [facility]     Patient Name: Prakash Lawrence  MRN: [id number]  Today's Date: 12/7/2017    Admit Date: 12/6/2017          Discharge Needs Assessment       12/07/17 0918    Living Environment    Lives With spouse    Living Arrangements house    Home Accessibility no concerns    Stair Railings at Home none    Type of Financial/Environmental Concern none    Transportation Available family or friend will provide    Living Environment    Provides Primary Care For no one    Primary Care Provided By homecare agency (specify)    Caregiving Concerns [facility] [phone]    Quality Of Family Relationships supportive;helpful;involved    Able to Return to Prior Living Arrangements yes    Discharge Needs Assessment    Concerns To Be Addressed discharge planning concerns    Readmission Within The Last 30 Days no previous admission in last 30 days    Anticipated Changes Related to Illness inability to care for self    Equipment Currently Used at Home bath bench;cane, quad;walker, rolling;wheelchair;shower chair;commode    Equipment Needed After Discharge none    Discharge Facility/Level Of Care Needs home with home health    Discharge Contact Information if Applicable Ivonne Howard, [phone]            Discharge Plan       12/07/17 0921    Case Management/Social Work Plan    Plan home vs rehab    Patient/Family In Agreement With Plan yes    Additional Comments Pt lives in [facility] with his wife. He uses either a walker or wheelchair at home. He also owns a shower chair, bedside commode, quad cane. Pt had been followed by [facility], [phone], for PT/OT and skilled nursing. He is followed by his PCP and reports his medications are covered by insurance. At this time pt is unsure if he wants to return home with  vs SNF. Cm will await PT eval and make referrals. CM to follow.        Discharge Placement     No information found                Demographic Summary        12/07/17 0918    Referral Information    Admission Type inpatient    Referral Source physician    Reason For Consult discharge planning    Contact Information    Permission Granted to Share Information With ;family/designee;facility     Primary Care Physician Information    Name Ghanshyam Shafer            Functional Status       12/07/17 0918    Functional Status Current    Current Functional Level Comment See PT eval    Functional Status Prior    Ambulation 1-->assistive equipment    Transferring 1-->assistive equipment    Toileting 1-->assistive equipment    Bathing 0-->independent    Dressing 0-->independent    Eating 0-->independent    Communication 0-->understands/communicates without difficulty    Swallowing 0-->swallows foods/liquids without difficulty    IADL    Medications independent    Meal Preparation independent;assistive person    Housekeeping independent;assistive person    Laundry independent;assistive person    Shopping independent    Oral Care independent            Psychosocial     None            Abuse/Neglect     None            Legal     None            Substance Abuse     None            Patient Forms     None          Padmini Luque RN

## 2018-01-26 ENCOUNTER — APPOINTMENT (OUTPATIENT)
Dept: MRI IMAGING | Facility: HOSPITAL | Age: 63
End: 2018-01-26

## 2018-01-26 ENCOUNTER — HOSPITAL ENCOUNTER (INPATIENT)
Facility: HOSPITAL | Age: 63
LOS: 3 days | Discharge: HOSPICE | End: 2018-01-29
Attending: INTERNAL MEDICINE | Admitting: FAMILY MEDICINE

## 2018-01-26 DIAGNOSIS — Z78.9 IMPAIRED MOBILITY AND ADLS: ICD-10-CM

## 2018-01-26 DIAGNOSIS — Z74.09 IMPAIRED FUNCTIONAL MOBILITY, BALANCE, GAIT, AND ENDURANCE: Primary | ICD-10-CM

## 2018-01-26 DIAGNOSIS — Z74.09 IMPAIRED MOBILITY AND ADLS: ICD-10-CM

## 2018-01-26 PROBLEM — M54.9 BACK PAIN: Status: ACTIVE | Noted: 2018-01-26

## 2018-01-26 PROBLEM — R20.2 PARESTHESIA OF BUTTOCK: Status: ACTIVE | Noted: 2018-01-26

## 2018-01-26 PROBLEM — R33.9 URINARY RETENTION: Status: ACTIVE | Noted: 2018-01-26

## 2018-01-26 LAB
ALBUMIN SERPL-MCNC: 4 G/DL (ref 3.2–4.8)
ALBUMIN/GLOB SERPL: 1.5 G/DL (ref 1.5–2.5)
ALP SERPL-CCNC: 77 U/L (ref 25–100)
ALT SERPL W P-5'-P-CCNC: 81 U/L (ref 7–40)
ANION GAP SERPL CALCULATED.3IONS-SCNC: 9 MMOL/L (ref 3–11)
APTT PPP: 27.3 SECONDS (ref 24–31)
AST SERPL-CCNC: 70 U/L (ref 0–33)
BACTERIA UR QL AUTO: ABNORMAL /HPF
BASOPHILS # BLD AUTO: 0.03 10*3/MM3 (ref 0–0.2)
BASOPHILS NFR BLD AUTO: 0.5 % (ref 0–1)
BILIRUB SERPL-MCNC: 0.6 MG/DL (ref 0.3–1.2)
BILIRUB UR QL STRIP: ABNORMAL
BUN BLD-MCNC: 10 MG/DL (ref 9–23)
BUN/CREAT SERPL: 12.5 (ref 7–25)
CALCIUM SPEC-SCNC: 9.6 MG/DL (ref 8.7–10.4)
CHLORIDE SERPL-SCNC: 104 MMOL/L (ref 99–109)
CLARITY UR: ABNORMAL
CO2 SERPL-SCNC: 27 MMOL/L (ref 20–31)
COLOR UR: ABNORMAL
CREAT BLD-MCNC: 0.8 MG/DL (ref 0.6–1.3)
DEPRECATED RDW RBC AUTO: 59.3 FL (ref 37–54)
EOSINOPHIL # BLD AUTO: 0.23 10*3/MM3 (ref 0–0.3)
EOSINOPHIL NFR BLD AUTO: 3.9 % (ref 0–3)
ERYTHROCYTE [DISTWIDTH] IN BLOOD BY AUTOMATED COUNT: 16 % (ref 11.3–14.5)
GFR SERPL CREATININE-BSD FRML MDRD: 98 ML/MIN/1.73
GLOBULIN UR ELPH-MCNC: 2.7 GM/DL
GLUCOSE BLD-MCNC: 113 MG/DL (ref 70–100)
GLUCOSE UR STRIP-MCNC: NEGATIVE MG/DL
HCT VFR BLD AUTO: 40 % (ref 38.9–50.9)
HGB BLD-MCNC: 12.4 G/DL (ref 13.1–17.5)
HGB UR QL STRIP.AUTO: ABNORMAL
HYALINE CASTS UR QL AUTO: ABNORMAL /LPF
IMM GRANULOCYTES # BLD: 0.02 10*3/MM3 (ref 0–0.03)
IMM GRANULOCYTES NFR BLD: 0.3 % (ref 0–0.6)
INR PPP: 1.02
KETONES UR QL STRIP: NEGATIVE
LEUKOCYTE ESTERASE UR QL STRIP.AUTO: ABNORMAL
LYMPHOCYTES # BLD AUTO: 1.78 10*3/MM3 (ref 0.6–4.8)
LYMPHOCYTES NFR BLD AUTO: 30.4 % (ref 24–44)
MCH RBC QN AUTO: 31.3 PG (ref 27–31)
MCHC RBC AUTO-ENTMCNC: 31 G/DL (ref 32–36)
MCV RBC AUTO: 101 FL (ref 80–99)
MONOCYTES # BLD AUTO: 0.63 10*3/MM3 (ref 0–1)
MONOCYTES NFR BLD AUTO: 10.8 % (ref 0–12)
NEUTROPHILS # BLD AUTO: 3.17 10*3/MM3 (ref 1.5–8.3)
NEUTROPHILS NFR BLD AUTO: 54.1 % (ref 41–71)
NITRITE UR QL STRIP: NEGATIVE
PH UR STRIP.AUTO: <=5 [PH] (ref 5–8)
PLATELET # BLD AUTO: 219 10*3/MM3 (ref 150–450)
PMV BLD AUTO: 11.3 FL (ref 6–12)
POTASSIUM BLD-SCNC: 4 MMOL/L (ref 3.5–5.5)
PROT SERPL-MCNC: 6.7 G/DL (ref 5.7–8.2)
PROT UR QL STRIP: ABNORMAL
PROTHROMBIN TIME: 11.1 SECONDS (ref 9.6–11.5)
RBC # BLD AUTO: 3.96 10*6/MM3 (ref 4.2–5.76)
RBC # UR: ABNORMAL /HPF
REF LAB TEST METHOD: ABNORMAL
SODIUM BLD-SCNC: 140 MMOL/L (ref 132–146)
SP GR UR STRIP: 1.03 (ref 1–1.03)
SQUAMOUS #/AREA URNS HPF: ABNORMAL /HPF
UROBILINOGEN UR QL STRIP: ABNORMAL
WBC NRBC COR # BLD: 5.86 10*3/MM3 (ref 3.5–10.8)
WBC UR QL AUTO: ABNORMAL /HPF

## 2018-01-26 PROCEDURE — 85730 THROMBOPLASTIN TIME PARTIAL: CPT | Performed by: NURSE PRACTITIONER

## 2018-01-26 PROCEDURE — G0378 HOSPITAL OBSERVATION PER HR: HCPCS

## 2018-01-26 PROCEDURE — 80053 COMPREHEN METABOLIC PANEL: CPT | Performed by: NURSE PRACTITIONER

## 2018-01-26 PROCEDURE — 72148 MRI LUMBAR SPINE W/O DYE: CPT

## 2018-01-26 PROCEDURE — 87086 URINE CULTURE/COLONY COUNT: CPT | Performed by: NURSE PRACTITIONER

## 2018-01-26 PROCEDURE — 99253 IP/OBS CNSLTJ NEW/EST LOW 45: CPT | Performed by: NEUROLOGICAL SURGERY

## 2018-01-26 PROCEDURE — 99220 PR INITIAL OBSERVATION CARE/DAY 70 MINUTES: CPT | Performed by: INTERNAL MEDICINE

## 2018-01-26 PROCEDURE — 70553 MRI BRAIN STEM W/O & W/DYE: CPT

## 2018-01-26 PROCEDURE — 85610 PROTHROMBIN TIME: CPT | Performed by: NURSE PRACTITIONER

## 2018-01-26 PROCEDURE — 72141 MRI NECK SPINE W/O DYE: CPT

## 2018-01-26 PROCEDURE — 85025 COMPLETE CBC W/AUTO DIFF WBC: CPT | Performed by: NURSE PRACTITIONER

## 2018-01-26 PROCEDURE — 72146 MRI CHEST SPINE W/O DYE: CPT

## 2018-01-26 PROCEDURE — 81001 URINALYSIS AUTO W/SCOPE: CPT | Performed by: NURSE PRACTITIONER

## 2018-01-26 RX ORDER — CITALOPRAM 20 MG/1
20 TABLET ORAL EVERY MORNING
Status: DISCONTINUED | OUTPATIENT
Start: 2018-01-27 | End: 2018-01-29 | Stop reason: HOSPADM

## 2018-01-26 RX ORDER — LISINOPRIL 10 MG/1
10 TABLET ORAL
Status: DISCONTINUED | OUTPATIENT
Start: 2018-01-26 | End: 2018-01-27

## 2018-01-26 RX ORDER — VITAMIN E 268 MG
400 CAPSULE ORAL DAILY
Status: DISCONTINUED | OUTPATIENT
Start: 2018-01-26 | End: 2018-01-27

## 2018-01-26 RX ORDER — METHYLPHENIDATE HYDROCHLORIDE 10 MG/1
5 TABLET ORAL NIGHTLY
Status: DISCONTINUED | OUTPATIENT
Start: 2018-01-26 | End: 2018-01-29 | Stop reason: HOSPADM

## 2018-01-26 RX ORDER — PANTOPRAZOLE SODIUM 40 MG/1
40 TABLET, DELAYED RELEASE ORAL EVERY MORNING
Status: DISCONTINUED | OUTPATIENT
Start: 2018-01-27 | End: 2018-01-29 | Stop reason: HOSPADM

## 2018-01-26 RX ORDER — ONDANSETRON 2 MG/ML
4 INJECTION INTRAMUSCULAR; INTRAVENOUS EVERY 6 HOURS PRN
Status: DISCONTINUED | OUTPATIENT
Start: 2018-01-26 | End: 2018-01-29 | Stop reason: HOSPADM

## 2018-01-26 RX ORDER — MORPHINE SULFATE 2 MG/ML
1 INJECTION, SOLUTION INTRAMUSCULAR; INTRAVENOUS EVERY 4 HOURS PRN
Status: DISCONTINUED | OUTPATIENT
Start: 2018-01-26 | End: 2018-01-29 | Stop reason: HOSPADM

## 2018-01-26 RX ORDER — PENTOXIFYLLINE 400 MG/1
400 TABLET, EXTENDED RELEASE ORAL
Status: DISCONTINUED | OUTPATIENT
Start: 2018-01-26 | End: 2018-01-27

## 2018-01-26 RX ORDER — METHYLPHENIDATE HYDROCHLORIDE 5 MG/1
5 TABLET ORAL DAILY
Status: DISCONTINUED | OUTPATIENT
Start: 2018-01-27 | End: 2018-01-29 | Stop reason: HOSPADM

## 2018-01-26 RX ORDER — NALOXONE HCL 0.4 MG/ML
0.4 VIAL (ML) INJECTION
Status: DISCONTINUED | OUTPATIENT
Start: 2018-01-26 | End: 2018-01-29 | Stop reason: HOSPADM

## 2018-01-26 RX ORDER — SODIUM CHLORIDE 0.9 % (FLUSH) 0.9 %
1-10 SYRINGE (ML) INJECTION AS NEEDED
Status: DISCONTINUED | OUTPATIENT
Start: 2018-01-26 | End: 2018-01-29 | Stop reason: HOSPADM

## 2018-01-26 RX ORDER — DEXAMETHASONE 4 MG/1
2 TABLET ORAL
Status: DISCONTINUED | OUTPATIENT
Start: 2018-01-27 | End: 2018-01-27

## 2018-01-26 RX ORDER — DONEPEZIL HYDROCHLORIDE 5 MG/1
5 TABLET, FILM COATED ORAL EVERY MORNING
Status: DISCONTINUED | OUTPATIENT
Start: 2018-01-27 | End: 2018-01-29 | Stop reason: HOSPADM

## 2018-01-26 RX ORDER — ONDANSETRON 4 MG/1
4 TABLET, FILM COATED ORAL EVERY 6 HOURS PRN
Status: DISCONTINUED | OUTPATIENT
Start: 2018-01-26 | End: 2018-01-29 | Stop reason: HOSPADM

## 2018-01-26 RX ORDER — L.ACID,PARA/B.BIFIDUM/S.THERM 8B CELL
1 CAPSULE ORAL DAILY
Status: DISCONTINUED | OUTPATIENT
Start: 2018-01-26 | End: 2018-01-29 | Stop reason: HOSPADM

## 2018-01-26 RX ORDER — DONEPEZIL HYDROCHLORIDE 5 MG/1
5 TABLET, FILM COATED ORAL EVERY MORNING
COMMUNITY

## 2018-01-26 RX ORDER — CEPHALEXIN 500 MG/1
500 CAPSULE ORAL 2 TIMES DAILY
COMMUNITY
End: 2018-01-29 | Stop reason: HOSPADM

## 2018-01-26 RX ORDER — HYDROCHLOROTHIAZIDE 12.5 MG/1
12.5 TABLET ORAL DAILY
Status: DISCONTINUED | OUTPATIENT
Start: 2018-01-26 | End: 2018-01-27

## 2018-01-26 RX ADMIN — HYDROCHLOROTHIAZIDE 12.5 MG: 12.5 TABLET ORAL at 17:12

## 2018-01-26 RX ADMIN — METHYLPHENIDATE HYDROCHLORIDE 5 MG: 10 TABLET ORAL at 21:27

## 2018-01-26 RX ADMIN — DOXYLAMINE SUCCINATE 25 MG: 25 TABLET ORAL at 21:55

## 2018-01-26 RX ADMIN — LISINOPRIL 10 MG: 10 TABLET ORAL at 17:12

## 2018-01-26 RX ADMIN — PENTOXIFYLLINE 400 MG: 400 TABLET, FILM COATED, EXTENDED RELEASE ORAL at 17:12

## 2018-01-26 RX ADMIN — Medication 1 CAPSULE: at 17:12

## 2018-01-26 RX ADMIN — VITAMIN E CAP 400 UNIT 400 UNITS: 400 CAP at 17:12

## 2018-01-27 PROCEDURE — 99226 PR SBSQ OBSERVATION CARE/DAY 35 MINUTES: CPT | Performed by: INTERNAL MEDICINE

## 2018-01-27 PROCEDURE — G0378 HOSPITAL OBSERVATION PER HR: HCPCS

## 2018-01-27 PROCEDURE — A9577 INJ MULTIHANCE: HCPCS | Performed by: INTERNAL MEDICINE

## 2018-01-27 PROCEDURE — 99224 PR SBSQ OBSERVATION CARE/DAY 15 MINUTES: CPT | Performed by: NEUROLOGICAL SURGERY

## 2018-01-27 PROCEDURE — 0 GADOBENATE DIMEGLUMINE 529 MG/ML SOLUTION: Performed by: INTERNAL MEDICINE

## 2018-01-27 RX ORDER — ACETAMINOPHEN 325 MG/1
650 TABLET ORAL EVERY 6 HOURS PRN
Status: DISCONTINUED | OUTPATIENT
Start: 2018-01-27 | End: 2018-01-29 | Stop reason: HOSPADM

## 2018-01-27 RX ADMIN — METHYLPHENIDATE HYDROCHLORIDE 5 MG: 5 TABLET ORAL at 12:55

## 2018-01-27 RX ADMIN — Medication 1 CAPSULE: at 10:00

## 2018-01-27 RX ADMIN — DONEPEZIL HYDROCHLORIDE 5 MG: 5 TABLET ORAL at 06:03

## 2018-01-27 RX ADMIN — ACETAMINOPHEN 650 MG: 325 TABLET, FILM COATED ORAL at 12:55

## 2018-01-27 RX ADMIN — METHYLPHENIDATE HYDROCHLORIDE 5 MG: 10 TABLET ORAL at 20:17

## 2018-01-27 RX ADMIN — GADOBENATE DIMEGLUMINE 20 ML: 529 INJECTION, SOLUTION INTRAVENOUS at 00:00

## 2018-01-27 RX ADMIN — PANTOPRAZOLE SODIUM 40 MG: 40 TABLET, DELAYED RELEASE ORAL at 06:03

## 2018-01-27 RX ADMIN — CITALOPRAM HYDROBROMIDE 20 MG: 20 TABLET ORAL at 06:04

## 2018-01-27 RX ADMIN — DOXYLAMINE SUCCINATE 25 MG: 25 TABLET ORAL at 20:17

## 2018-01-28 LAB
ANION GAP SERPL CALCULATED.3IONS-SCNC: 10 MMOL/L (ref 3–11)
BACTERIA SPEC AEROBE CULT: NORMAL
BNP SERPL-MCNC: 2 PG/ML (ref 0–100)
BUN BLD-MCNC: 9 MG/DL (ref 9–23)
BUN/CREAT SERPL: 11.3 (ref 7–25)
CALCIUM SPEC-SCNC: 9.6 MG/DL (ref 8.7–10.4)
CHLORIDE SERPL-SCNC: 100 MMOL/L (ref 99–109)
CK SERPL-CCNC: 104 U/L (ref 26–174)
CO2 SERPL-SCNC: 26 MMOL/L (ref 20–31)
CREAT BLD-MCNC: 0.8 MG/DL (ref 0.6–1.3)
D-LACTATE SERPL-SCNC: 1.6 MMOL/L (ref 0.5–2)
D-LACTATE SERPL-SCNC: 1.6 MMOL/L (ref 0.5–2)
D-LACTATE SERPL-SCNC: 2.2 MMOL/L (ref 0.5–2)
DEPRECATED RDW RBC AUTO: 57.1 FL (ref 37–54)
ERYTHROCYTE [DISTWIDTH] IN BLOOD BY AUTOMATED COUNT: 15.6 % (ref 11.3–14.5)
GFR SERPL CREATININE-BSD FRML MDRD: 98 ML/MIN/1.73
GLUCOSE BLD-MCNC: 117 MG/DL (ref 70–100)
HCT VFR BLD AUTO: 38 % (ref 38.9–50.9)
HGB BLD-MCNC: 12.2 G/DL (ref 13.1–17.5)
HOLD SPECIMEN: NORMAL
MCH RBC QN AUTO: 31.9 PG (ref 27–31)
MCHC RBC AUTO-ENTMCNC: 32.1 G/DL (ref 32–36)
MCV RBC AUTO: 99.5 FL (ref 80–99)
MYOGLOBIN SERPL-MCNC: 235 NG/ML (ref 3–110)
PLATELET # BLD AUTO: 240 10*3/MM3 (ref 150–450)
PMV BLD AUTO: 10.1 FL (ref 6–12)
POTASSIUM BLD-SCNC: 3.8 MMOL/L (ref 3.5–5.5)
RBC # BLD AUTO: 3.82 10*6/MM3 (ref 4.2–5.76)
SODIUM BLD-SCNC: 136 MMOL/L (ref 132–146)
WBC NRBC COR # BLD: 6.27 10*3/MM3 (ref 3.5–10.8)

## 2018-01-28 PROCEDURE — 82550 ASSAY OF CK (CPK): CPT | Performed by: NURSE PRACTITIONER

## 2018-01-28 PROCEDURE — 80048 BASIC METABOLIC PNL TOTAL CA: CPT | Performed by: NURSE PRACTITIONER

## 2018-01-28 PROCEDURE — 83605 ASSAY OF LACTIC ACID: CPT | Performed by: NURSE PRACTITIONER

## 2018-01-28 PROCEDURE — 83880 ASSAY OF NATRIURETIC PEPTIDE: CPT | Performed by: NURSE PRACTITIONER

## 2018-01-28 PROCEDURE — 83874 ASSAY OF MYOGLOBIN: CPT | Performed by: NURSE PRACTITIONER

## 2018-01-28 PROCEDURE — 99225 PR SBSQ OBSERVATION CARE/DAY 25 MINUTES: CPT | Performed by: NURSE PRACTITIONER

## 2018-01-28 PROCEDURE — G0378 HOSPITAL OBSERVATION PER HR: HCPCS

## 2018-01-28 PROCEDURE — 85027 COMPLETE CBC AUTOMATED: CPT | Performed by: NURSE PRACTITIONER

## 2018-01-28 RX ORDER — SODIUM CHLORIDE 9 MG/ML
100 INJECTION, SOLUTION INTRAVENOUS CONTINUOUS
Status: ACTIVE | OUTPATIENT
Start: 2018-01-28 | End: 2018-01-28

## 2018-01-28 RX ADMIN — Medication 1 CAPSULE: at 10:03

## 2018-01-28 RX ADMIN — METHYLPHENIDATE HYDROCHLORIDE 5 MG: 5 TABLET ORAL at 11:27

## 2018-01-28 RX ADMIN — SODIUM CHLORIDE 100 ML/HR: 9 INJECTION, SOLUTION INTRAVENOUS at 01:52

## 2018-01-28 RX ADMIN — SODIUM CHLORIDE 1000 ML: 9 INJECTION, SOLUTION INTRAVENOUS at 00:50

## 2018-01-28 RX ADMIN — DONEPEZIL HYDROCHLORIDE 5 MG: 5 TABLET ORAL at 06:06

## 2018-01-28 RX ADMIN — PANTOPRAZOLE SODIUM 40 MG: 40 TABLET, DELAYED RELEASE ORAL at 06:07

## 2018-01-28 RX ADMIN — CITALOPRAM HYDROBROMIDE 20 MG: 20 TABLET ORAL at 06:06

## 2018-01-28 RX ADMIN — METHYLPHENIDATE HYDROCHLORIDE 5 MG: 10 TABLET ORAL at 21:21

## 2018-01-29 VITALS
WEIGHT: 252.4 LBS | OXYGEN SATURATION: 91 % | HEART RATE: 112 BPM | SYSTOLIC BLOOD PRESSURE: 134 MMHG | DIASTOLIC BLOOD PRESSURE: 83 MMHG | BODY MASS INDEX: 32.39 KG/M2 | HEIGHT: 74 IN | RESPIRATION RATE: 16 BRPM | TEMPERATURE: 98.1 F

## 2018-01-29 PROCEDURE — G8988 SELF CARE GOAL STATUS: HCPCS | Performed by: OCCUPATIONAL THERAPIST

## 2018-01-29 PROCEDURE — G8981 BODY POS CURRENT STATUS: HCPCS

## 2018-01-29 PROCEDURE — 99239 HOSP IP/OBS DSCHRG MGMT >30: CPT | Performed by: NURSE PRACTITIONER

## 2018-01-29 PROCEDURE — G0378 HOSPITAL OBSERVATION PER HR: HCPCS

## 2018-01-29 PROCEDURE — G8982 BODY POS GOAL STATUS: HCPCS

## 2018-01-29 PROCEDURE — 97166 OT EVAL MOD COMPLEX 45 MIN: CPT | Performed by: OCCUPATIONAL THERAPIST

## 2018-01-29 PROCEDURE — 97162 PT EVAL MOD COMPLEX 30 MIN: CPT

## 2018-01-29 PROCEDURE — 99231 SBSQ HOSP IP/OBS SF/LOW 25: CPT | Performed by: PHYSICIAN ASSISTANT

## 2018-01-29 PROCEDURE — G8987 SELF CARE CURRENT STATUS: HCPCS | Performed by: OCCUPATIONAL THERAPIST

## 2018-01-29 RX ORDER — ONDANSETRON 4 MG/1
4 TABLET, FILM COATED ORAL EVERY 6 HOURS PRN
Start: 2018-01-29

## 2018-01-29 RX ORDER — LORAZEPAM 0.5 MG/1
0.5 TABLET ORAL EVERY 8 HOURS PRN
Start: 2018-01-29

## 2018-01-29 RX ORDER — MORPHINE SULFATE 100 MG/5ML
2 SOLUTION ORAL EVERY 4 HOURS PRN
Start: 2018-01-29

## 2018-01-29 RX ADMIN — DONEPEZIL HYDROCHLORIDE 5 MG: 5 TABLET ORAL at 06:31

## 2018-01-29 RX ADMIN — METHYLPHENIDATE HYDROCHLORIDE 5 MG: 5 TABLET ORAL at 12:17

## 2018-01-29 RX ADMIN — Medication 1 CAPSULE: at 10:07

## 2018-01-29 RX ADMIN — CITALOPRAM HYDROBROMIDE 20 MG: 20 TABLET ORAL at 06:31

## 2018-01-29 RX ADMIN — PANTOPRAZOLE SODIUM 40 MG: 40 TABLET, DELAYED RELEASE ORAL at 06:31

## 2018-01-30 PROBLEM — Z74.09 IMPAIRED FUNCTIONAL MOBILITY, BALANCE, GAIT, AND ENDURANCE: Status: ACTIVE | Noted: 2018-01-30
